# Patient Record
Sex: MALE | Race: WHITE | NOT HISPANIC OR LATINO | Employment: OTHER | URBAN - METROPOLITAN AREA
[De-identification: names, ages, dates, MRNs, and addresses within clinical notes are randomized per-mention and may not be internally consistent; named-entity substitution may affect disease eponyms.]

---

## 2019-10-11 ENCOUNTER — TELEPHONE (OUTPATIENT)
Dept: HEMATOLOGY/ONCOLOGY | Facility: CLINIC | Age: 79
End: 2019-10-11

## 2019-10-11 NOTE — TELEPHONE ENCOUNTER
Return call left voice mail that the message was sent to [employer].          ----- Message from Armond Manriquez sent at 10/10/2019  4:25 PM CDT -----  Contact: Amanda cassidy/ [facility]  Calling to speak with a  regarding an appt for the pt    Contact: [phone]

## 2019-10-17 ENCOUNTER — OFFICE VISIT (OUTPATIENT)
Dept: HEMATOLOGY/ONCOLOGY | Facility: CLINIC | Age: 79
End: 2019-10-17
Payer: OTHER GOVERNMENT

## 2019-10-17 ENCOUNTER — LAB VISIT (OUTPATIENT)
Dept: LAB | Facility: HOSPITAL | Age: 79
End: 2019-10-17
Attending: INTERNAL MEDICINE
Payer: OTHER GOVERNMENT

## 2019-10-17 VITALS
WEIGHT: 234.13 LBS | OXYGEN SATURATION: 94 % | HEART RATE: 60 BPM | RESPIRATION RATE: 18 BRPM | HEIGHT: 72 IN | TEMPERATURE: 98 F | SYSTOLIC BLOOD PRESSURE: 123 MMHG | DIASTOLIC BLOOD PRESSURE: 58 MMHG | BODY MASS INDEX: 31.71 KG/M2

## 2019-10-17 DIAGNOSIS — Z71.89 ADVANCE CARE PLANNING: ICD-10-CM

## 2019-10-17 DIAGNOSIS — C22.0 HCC (HEPATOCELLULAR CARCINOMA): Primary | ICD-10-CM

## 2019-10-17 DIAGNOSIS — K70.30 ALCOHOLIC CIRRHOSIS OF LIVER WITHOUT ASCITES: ICD-10-CM

## 2019-10-17 DIAGNOSIS — C22.0 HCC (HEPATOCELLULAR CARCINOMA): ICD-10-CM

## 2019-10-17 LAB
AFP SERPL-MCNC: 4.8 NG/ML (ref 0–8.4)
ALBUMIN SERPL BCP-MCNC: 3.7 G/DL (ref 3.5–5.2)
ALP SERPL-CCNC: 155 U/L (ref 55–135)
ALT SERPL W/O P-5'-P-CCNC: 26 U/L (ref 10–44)
ANION GAP SERPL CALC-SCNC: 8 MMOL/L (ref 8–16)
AST SERPL-CCNC: 44 U/L (ref 10–40)
BASOPHILS # BLD AUTO: 0.04 K/UL (ref 0–0.2)
BASOPHILS NFR BLD: 0.4 % (ref 0–1.9)
BILIRUB SERPL-MCNC: 1.6 MG/DL (ref 0.1–1)
BUN SERPL-MCNC: 12 MG/DL (ref 8–23)
CALCIUM SERPL-MCNC: 9.6 MG/DL (ref 8.7–10.5)
CHLORIDE SERPL-SCNC: 105 MMOL/L (ref 95–110)
CO2 SERPL-SCNC: 29 MMOL/L (ref 23–29)
CREAT SERPL-MCNC: 1.3 MG/DL (ref 0.5–1.4)
DIFFERENTIAL METHOD: ABNORMAL
EOSINOPHIL # BLD AUTO: 0.3 K/UL (ref 0–0.5)
EOSINOPHIL NFR BLD: 3.2 % (ref 0–8)
ERYTHROCYTE [DISTWIDTH] IN BLOOD BY AUTOMATED COUNT: 14.3 % (ref 11.5–14.5)
EST. GFR  (AFRICAN AMERICAN): 60 ML/MIN/1.73 M^2
EST. GFR  (NON AFRICAN AMERICAN): 51.9 ML/MIN/1.73 M^2
GLUCOSE SERPL-MCNC: 135 MG/DL (ref 70–110)
HCT VFR BLD AUTO: 46.6 % (ref 40–54)
HGB BLD-MCNC: 15.1 G/DL (ref 14–18)
IMM GRANULOCYTES # BLD AUTO: 0.03 K/UL (ref 0–0.04)
IMM GRANULOCYTES NFR BLD AUTO: 0.3 % (ref 0–0.5)
INR PPP: 1.2 (ref 0.8–1.2)
LYMPHOCYTES # BLD AUTO: 1.3 K/UL (ref 1–4.8)
LYMPHOCYTES NFR BLD: 13.5 % (ref 18–48)
MCH RBC QN AUTO: 33.8 PG (ref 27–31)
MCHC RBC AUTO-ENTMCNC: 32.4 G/DL (ref 32–36)
MCV RBC AUTO: 104 FL (ref 82–98)
MONOCYTES # BLD AUTO: 0.9 K/UL (ref 0.3–1)
MONOCYTES NFR BLD: 9.3 % (ref 4–15)
NEUTROPHILS # BLD AUTO: 6.9 K/UL (ref 1.8–7.7)
NEUTROPHILS NFR BLD: 73.3 % (ref 38–73)
NRBC BLD-RTO: 0 /100 WBC
PLATELET # BLD AUTO: 127 K/UL (ref 150–350)
PMV BLD AUTO: 9.3 FL (ref 9.2–12.9)
POTASSIUM SERPL-SCNC: 4.7 MMOL/L (ref 3.5–5.1)
PROT SERPL-MCNC: 6.9 G/DL (ref 6–8.4)
PROTHROMBIN TIME: 12.6 SEC (ref 9–12.5)
RBC # BLD AUTO: 4.47 M/UL (ref 4.6–6.2)
SODIUM SERPL-SCNC: 142 MMOL/L (ref 136–145)
WBC # BLD AUTO: 9.38 K/UL (ref 3.9–12.7)

## 2019-10-17 PROCEDURE — 36415 COLL VENOUS BLD VENIPUNCTURE: CPT

## 2019-10-17 PROCEDURE — 85025 COMPLETE CBC W/AUTO DIFF WBC: CPT

## 2019-10-17 PROCEDURE — 99214 OFFICE O/P EST MOD 30 MIN: CPT | Mod: PBBFAC | Performed by: INTERNAL MEDICINE

## 2019-10-17 PROCEDURE — 82105 ALPHA-FETOPROTEIN SERUM: CPT

## 2019-10-17 PROCEDURE — 99205 PR OFFICE/OUTPT VISIT, NEW, LEVL V, 60-74 MIN: ICD-10-PCS | Mod: S$PBB,,, | Performed by: INTERNAL MEDICINE

## 2019-10-17 PROCEDURE — 85610 PROTHROMBIN TIME: CPT

## 2019-10-17 PROCEDURE — 99205 OFFICE O/P NEW HI 60 MIN: CPT | Mod: S$PBB,,, | Performed by: INTERNAL MEDICINE

## 2019-10-17 PROCEDURE — 99999 PR PBB SHADOW E&M-EST. PATIENT-LVL IV: ICD-10-PCS | Mod: PBBFAC,,, | Performed by: INTERNAL MEDICINE

## 2019-10-17 PROCEDURE — 80053 COMPREHEN METABOLIC PANEL: CPT

## 2019-10-17 PROCEDURE — 99999 PR PBB SHADOW E&M-EST. PATIENT-LVL IV: CPT | Mod: PBBFAC,,, | Performed by: INTERNAL MEDICINE

## 2019-10-17 RX ORDER — SIMVASTATIN 40 MG/1
40 TABLET, FILM COATED ORAL
COMMUNITY

## 2019-10-17 RX ORDER — PROPRANOLOL HYDROCHLORIDE 10 MG/1
10 TABLET ORAL
COMMUNITY

## 2019-10-17 RX ORDER — ALBUTEROL SULFATE 0.83 MG/ML
1 SOLUTION RESPIRATORY (INHALATION) 2 TIMES DAILY PRN
COMMUNITY

## 2019-10-17 RX ORDER — MULTIVITAMIN
1 TABLET ORAL
COMMUNITY

## 2019-10-17 RX ORDER — AMLODIPINE BESYLATE 5 MG/1
5 TABLET ORAL
COMMUNITY

## 2019-10-17 RX ORDER — SPIRONOLACTONE 25 MG/1
50 TABLET ORAL
COMMUNITY

## 2019-10-17 RX ORDER — FUROSEMIDE 20 MG/1
20 TABLET ORAL
COMMUNITY

## 2019-10-17 NOTE — Clinical Note
Schedule for first available MRI abdomen/pelvis and let patient know. Lives in [employer] [place]. Need to see IR for local treatment for liver cancer

## 2019-10-17 NOTE — PROGRESS NOTES
"CC:  HCC    HPI:  Mr Lang is a 80 yo man with PMH of COPD, hypertension, abdominal aortic aneurysm status post repair and alcoholic cirrhosis of the liver who is referred by Dr Geronimo for a second opinion re localized liver cancer. [facility] As [facility] Dr Geronimo's note, [facility].  "Per review of his available outside hospital records it appears that he was initially noted to have a small lesion in the right lobe of the liver on an MRI scan in 2013 and this was subsequently followed up with additional imaging studies over the next 2 years which continue to demonstrate evidence of a poorly defined mass with heterogenous enhancement with rapid washout in segment 7 of the liver. This led to his referral to medical oncology and following that time he had another CT scan in September 2016 that showed evidence of a 2.2 cm enhancing nodule within the right hepatic lobe highly concerning for hepatocellular carcinoma in the setting of underlying cirrhosis of the liver. It was initially discussed about the possibility of obtaining a liver biopsy at [facility] but after patient's case was discussed at the [facility] tumor board plans for biopsy were canceled as they felt that his imaging findings in the setting of cirrhosis were confirmatory for HCC. Patient then had a microwave ablation done to his segment 7 lesion in [place] in 2016. Since that time patient has been followed on observation for his liver cancer with periodic surveillance imaging studies. He did have an MRI of the liver done in March 2018 which showed a new small focus of subtle arterial enhancement with washout near his previous segment 7 ablation site as well as numerous additional small arterial enhancing foci with relative washout. It was discussed at that time possible referral back to the [place] tumor board but this was not subsequently done. Patient subsequently most " "recently had an MRI of the abdomen with contrast on 2019 to reassess his liver lesions. At that time multiple enhancing lesions within segment 8 of the liver were seen with the largest measuring up to 2 cm in size and smaller lesions were also noted as well. There is a 3 mm lesion within segment 6 of the liver with faint washout detected. In addition there was a faintly hyperintense 1.4 cm focus within segment 7 of the liver demonstrating no evidence of arterial enhancement at the site of his previous ablation however there was a 2.1 cm focus of washout detected medial to this area which had enlarged in comparison to his previous MRI in 2019." He was referred to Dr Geroinmo. Labs at [facility] showed preserved liver function and normal AFP.   CT A/P 19 showed "A 15 x 17 mm hypoenhancing nodule right lobe of the liver is noted and likely represents a treated HCC.  No new or other malignant liver lesion can be documented. Hepatic cirrhosis with multiple esophageal and perigastric varices. Previous endovascular repair of abdominal aortic aneurysm.  Aneurysm sac has decreased in size. Chronic dilatation of the intrahepatic bile ducts left lobe of liver is stable. Nonobstructing right nephrolithiasis; cholelithiasis. Enlarged prostate gland  CT chest 19 showed "Dependent atelectasis or scarring within the lower lobes bilaterally is noted.  No mass or nodule is seen.  Esophageal varices"  Local treatment was recommended. Patient presents today for a second opinion. He is feeling well. Does have chest congestion from the weather changes. Uses nebulizer at home for COPD. Drinks 3 glasses of wine every evening. Chases his 4yr granddaughter at home. Cannot walk far because of COPD.     ECO    Past Medical History:   Diagnosis Date    COPD (chronic obstructive pulmonary disease)     Hypertension         Past Surgical History:   Procedure Laterality Date    CATARACT EXTRACTION      CYST REMOVAL   "       Family History   Problem Relation Age of Onset    Parkinsonism Mother     Cancer Father        Social History     Socioeconomic History    Marital status:      Spouse name: Not on file    Number of children: Not on file    Years of education: Not on file    Highest education level: Not on file   Occupational History    Not on file   Social Needs    Financial resource strain: Not on file    Food insecurity:     Worry: Not on file     Inability: Not on file    Transportation needs:     Medical: Not on file     Non-medical: Not on file   Tobacco Use    Smoking status: Not on file   Substance and Sexual Activity    Alcohol use: Not on file    Drug use: Not on file    Sexual activity: Not on file   Lifestyle    Physical activity:     Days per week: Not on file     Minutes per session: Not on file    Stress: Not on file   Relationships    Social connections:     Talks on phone: Not on file     Gets together: Not on file     Attends Hoahaoism service: Not on file     Active member of club or organization: Not on file     Attends meetings of clubs or organizations: Not on file     Relationship status: Not on file   Other Topics Concern    Not on file   Social History Narrative    Not on file       Review of Systems   Constitutional: Negative for appetite change, chills, fatigue, fever and unexpected weight change.   HENT: Negative for mouth sores, nosebleeds, tinnitus, trouble swallowing and voice change.    Eyes: Negative for pain, redness and visual disturbance.   Respiratory: Positive for shortness of breath (chronic). Negative for cough and wheezing.    Cardiovascular: Negative for chest pain, palpitations and leg swelling.   Gastrointestinal: Negative for abdominal distention, abdominal pain, blood in stool, constipation, diarrhea, nausea and vomiting.   Endocrine: Negative for polydipsia, polyphagia and polyuria.   Genitourinary: Negative for flank pain, frequency and hematuria.    Musculoskeletal: Negative for arthralgias, back pain, gait problem, joint swelling, myalgias, neck pain and neck stiffness.   Skin: Negative for color change, pallor, rash and wound.   Neurological: Negative for tremors, seizures, syncope, speech difficulty, weakness, light-headedness, numbness and headaches.   Hematological: Negative for adenopathy. Does not bruise/bleed easily.   Psychiatric/Behavioral: Negative for confusion, dysphoric mood and self-injury. The patient is not nervous/anxious.    All other systems reviewed and are negative.      Objective:  Physical Exam   Constitutional: He is oriented to person, place, and time. He appears well-developed and well-nourished. No distress.   HENT:   Head: Normocephalic and atraumatic.   Mouth/Throat: Oropharynx is clear and moist. No oropharyngeal exudate.   Eyes: Pupils are equal, round, and reactive to light. Conjunctivae and EOM are normal. No scleral icterus.   Neck: Normal range of motion. Neck supple. No JVD present. No thyromegaly present.   Cardiovascular: Normal rate, regular rhythm and normal heart sounds. Exam reveals no gallop and no friction rub.   No murmur heard.  Pulmonary/Chest: Effort normal and breath sounds normal. No respiratory distress. He has no wheezes. He has no rales.   Abdominal: Soft. Bowel sounds are normal. He exhibits no distension and no mass. There is no hepatosplenomegaly. There is no tenderness. There is no rebound. No hernia.   Musculoskeletal: Normal range of motion. He exhibits edema (b/l LE trace edema). He exhibits no tenderness or deformity.   Lymphadenopathy:     He has no cervical adenopathy.        Right: No supraclavicular adenopathy present.        Left: No supraclavicular adenopathy present.   Neurological: He is alert and oriented to person, place, and time. No cranial nerve deficit. He exhibits normal muscle tone.   Skin: Skin is warm and dry. No rash noted. No erythema. No pallor.   Psychiatric: He has a normal  "mood and affect. His behavior is normal. Judgment and thought content normal.   Vitals reviewed.      Labs:  Labs from [facility]  9/12/19  WBC 8.4, Hb 15.3, plt count 129, creatinine 1.02, bili 1.6, albumin 4.1, AST 52, ALT 27, AFP normal 4.8    Imaging Data:  [facility]: "multiple enhancing lesions within segment 8 of the liver were seen with the largest measuring up to 2 cm in size and smaller lesions were also noted as well. There is a 3 mm lesion within segment 6 of the liver with faint washout detected. In addition there was a faintly hyperintense 1.4 cm focus within segment 7 of the liver demonstrating no evidence of arterial enhancement at the site of his previous ablation however there was a 2.1 cm focus of washout detected medial to this area which had enlarged in comparison to his previous MRI in March 2019."     CT chest 9/17/19 showed "Dependent atelectasis or scarring within the lower lobes bilaterally is noted.  No mass or nodule is seen.  Esophageal varices"    Assessment and plan:    1. HCC (hepatocellular carcinoma)    2. Alcoholic cirrhosis of liver without ascites    3. Advance care planning      1.2  - Mr Lang is a 80 yo man with HCC treated with ablation to segment 7 lesion in 2016. [facility] showing "multiple enhancing lesions within segment 8 of the liver were seen with the largest measuring up to 2 cm in size and smaller lesions were also noted as well. There is a 3 mm lesion within segment 6 of the liver with faint washout detected. In addition there was a faintly hyperintense 1.4 cm focus within segment 7 of the liver demonstrating no evidence of arterial enhancement at the site of his previous ablation however there was a 2.1 cm focus of washout detected medial to this area which had enlarged in comparison to his previous MRI in March 2019."   - no imaging was brought in today. [facility] " ([facility] abdomen Aug 2019, March 2019 and previous) and [facility] (CT C/A/P Sep 2019)  - will get MRI abdomen/pelvis here since the last one was two months ago  - Check CBC, CMP, INR, AFP  - discussed with patient that if repeat MRI confirmed localized HCC within the liver, I recommend local treatment such as ablation or TACE.   - consult IR for local treatment for HCC  - I will call him with the MRI result    3.   Advance Care Planning     Power of   I initiated the process of advance care planning today and explained the importance of this process to the patient.  I introduced the concept of advance directives to the patient, as well. Then the patient received detailed information about the importance of designating a Health Care Power of  (HCPOA). He was also instructed to communicate with this person about their wishes for future healthcare, should he become sick and lose decision-making capacity. The patient has previously appointed a HCPOA. After our discussion, the patient has decided to complete a HCPOA and has appointed his significant other and NAME: Gilam Paco .       Their multiple questions were answered in the office. Encouraged to call should questions arise.

## 2019-10-17 NOTE — LETTER
October 17, 2019      Roland Geronimo MD  [address]  [place] [address]           [facility] Hematology Oncology  [facility]  [place] [address]  Phone: [phone]          Patient: Molina Lang Jr   MR Number: [id number]   YOB: 1940   Date of Visit: 10/17/2019       Dear Dr. Roland Geronimo:    Thank you for referring Molina Lang Jr to me for evaluation. Attached you will find relevant portions of my assessment and plan of care.    If you have questions, please do not hesitate to call me. I look forward to following Molina Lang Jr along with you.    Sincerely,    Best Delgado MD    Enclosure  CC:  No Recipients    If you would like to receive this communication electronically, please contact [email or web address] or [phone] to request more information on [employer] access.    For providers and/or their staff who would like to refer a patient to [facility], please contact us through our one-stop-shop provider referral line, [facility] , at [phone].    If you feel you have received this communication in error or would no longer like to receive these types of communications, please e-mail [email or web address]

## 2019-10-22 ENCOUNTER — TELEPHONE (OUTPATIENT)
Dept: HEMATOLOGY/ONCOLOGY | Facility: CLINIC | Age: 79
End: 2019-10-22

## 2019-10-22 NOTE — TELEPHONE ENCOUNTER
"Returned call and spoke with Hector with [facility]. Hector calling to verify the order for Mr Chambers MRI Abdomen/Pelvis w/wo contrast. Hector states she would like to give the radiologist a bit more information as to the reason the Pelvis is being ordered."Is the Dr looking for Mets to the Pelvis"? This test is a very long exam. This test will take up to 2 hours and it is a very difficult test for patient's to go though.      "

## 2019-10-22 NOTE — TELEPHONE ENCOUNTER
----- Message from Purnima Lizz sent at 10/22/2019 10:24 AM CDT -----  Contact: Lala [phone]   with [facility]   Pt.is scheduled to come in at Friday at 2pm.   Does pt. Need the Pelvic also.   Order is for Abdomen and Pelvic.   Needs a dx for the Pelvic .   Pls call .

## 2019-10-22 NOTE — TELEPHONE ENCOUNTER
----- Message from Salma Monk sent at 10/22/2019 12:16 PM CDT -----  Contact: Lala ([facility]): [phone]  Lala state she is returning a call from someone in the clinic re the pt     Please contact Lala ([email or web address]): [phone]

## 2019-10-22 NOTE — TELEPHONE ENCOUNTER
Returned call to Lala with [facility]. No answer, message left on voice mail requesting a return call.

## 2019-10-28 DIAGNOSIS — C22.0 HCC (HEPATOCELLULAR CARCINOMA): Primary | ICD-10-CM

## 2019-10-31 ENCOUNTER — HOSPITAL ENCOUNTER (OUTPATIENT)
Dept: RADIOLOGY | Facility: HOSPITAL | Age: 79
Discharge: HOME | End: 2019-10-31
Attending: INTERNAL MEDICINE
Payer: COMMERCIAL

## 2019-10-31 DIAGNOSIS — C22.0 HCC (HEPATOCELLULAR CARCINOMA): ICD-10-CM

## 2019-10-31 PROCEDURE — 74183 MRI ABD W/O CNTR FLWD CNTR: CPT | Mod: TC

## 2019-10-31 PROCEDURE — 25500020 PHARM REV CODE 255: Performed by: INTERNAL MEDICINE

## 2019-10-31 PROCEDURE — 74183 MRI ABD W/O CNTR FLWD CNTR: CPT | Mod: 26,,, | Performed by: RADIOLOGY

## 2019-10-31 PROCEDURE — 74183 MRI ABDOMEN W WO CONTRAST: ICD-10-PCS | Mod: 26,,, | Performed by: RADIOLOGY

## 2019-10-31 PROCEDURE — A9585 GADOBUTROL INJECTION: HCPCS | Performed by: INTERNAL MEDICINE

## 2019-10-31 RX ORDER — GADOBUTROL 604.72 MG/ML
10 INJECTION INTRAVENOUS
Status: COMPLETED | OUTPATIENT
Start: 2019-10-31 | End: 2019-10-31

## 2019-10-31 RX ADMIN — GADOBUTROL 10 ML: 604.72 INJECTION INTRAVENOUS at 01:10

## 2019-11-01 ENCOUNTER — TELEPHONE (OUTPATIENT)
Dept: HEMATOLOGY/ONCOLOGY | Facility: CLINIC | Age: 79
End: 2019-11-01

## 2019-11-01 NOTE — TELEPHONE ENCOUNTER
Spoke with Mr Lang over the phone. Discussed MRI abdomen result. It showed three HCC in the liver. Will reach out to IR to see when they can schedule the consult for local treatment. [email or web address]. Patient understands and agrees with the plan.

## 2019-11-05 ENCOUNTER — TELEPHONE (OUTPATIENT)
Dept: HEMATOLOGY/ONCOLOGY | Facility: CLINIC | Age: 79
End: 2019-11-05

## 2019-11-05 NOTE — TELEPHONE ENCOUNTER
----- Message from Bria Lopez sent at 11/5/2019  8:15 AM CST -----  Contact: john from [facility]   john from [facility] called to speak with You   Callback#[phone]  Thank You  UCHE Lopez

## 2019-11-05 NOTE — TELEPHONE ENCOUNTER
Returned call and spoke with Stephania with [facility] --Release of Information. Stephania calling to inform us the the disc are sent via [employer]. They went out today and she faxed copies of medical records x 2. Informed Stephania I never received her faxes. Gave Stephania another fax number to fax the records to. Done   Records received.

## 2019-11-05 NOTE — TELEPHONE ENCOUNTER
----- Message from Kayleigh Hermosillo, Patient Care Assistant sent at 11/5/2019  3:55 PM CST -----  Contact: [facility]  Name of Who is Calling: [facility]    What is the request in detail:Requesting to speak with someone in regards of fax. Charline states records are in route through [employer].  Please contact to further discuss and advise      Can the clinic reply by [facility]: No    [facility] Back [facility]   [phone]

## 2019-11-05 NOTE — TELEPHONE ENCOUNTER
Returned call to Charline with [facility] release of information. No answer, message left on voice mail.

## 2019-11-05 NOTE — TELEPHONE ENCOUNTER
Placed 2nd call and spoke with Charline. Charline states she did not receive the release of information fax to their office yesterday. Charline ask if I could re fax the DEB to a different fax number [phone]. Done.

## 2019-11-06 ENCOUNTER — TELEPHONE (OUTPATIENT)
Dept: HEMATOLOGY/ONCOLOGY | Facility: CLINIC | Age: 79
End: 2019-11-06

## 2019-11-06 NOTE — TELEPHONE ENCOUNTER
Documentation: Received from [facility], 2 disc containing the following. Ct Scan Chest/Thorax with Contrast and Ct Scan Abdomen and Pelvis with Contrast dated Sept 17, 2019.   Disc submitted to [facility] today for up uploading. Request ASAP

## 2019-11-12 ENCOUNTER — TELEPHONE (OUTPATIENT)
Dept: HEMATOLOGY/ONCOLOGY | Facility: CLINIC | Age: 79
End: 2019-11-12

## 2019-11-12 NOTE — TELEPHONE ENCOUNTER
Called and spoke with Mr Lang, giving him appointment information. Mr Lang has been scheduled for his consultation visit with Interventional Radiology on Nov. 21 @ 10:00 am. Appointment information mailed to patient.

## 2019-11-13 ENCOUNTER — TELEPHONE (OUTPATIENT)
Dept: HEMATOLOGY/ONCOLOGY | Facility: CLINIC | Age: 79
End: 2019-11-13

## 2019-11-13 NOTE — TELEPHONE ENCOUNTER
Called and spoke with Mr Lang. He is scheduled for IR consult next Thursday. Discussed with Mr Lang that he can either follow up with me or with Dr Geronimo at [facility]. Mr Lang prefers to see Dr Geronimo as he is closer. Encouraged to call should questions arise.

## 2019-11-21 ENCOUNTER — OFFICE VISIT (OUTPATIENT)
Dept: INTERVENTIONAL RADIOLOGY/VASCULAR | Facility: CLINIC | Age: 79
End: 2019-11-21
Payer: COMMERCIAL

## 2019-11-21 VITALS
DIASTOLIC BLOOD PRESSURE: 67 MMHG | WEIGHT: 233.69 LBS | SYSTOLIC BLOOD PRESSURE: 120 MMHG | BODY MASS INDEX: 31.65 KG/M2 | HEIGHT: 72 IN | HEART RATE: 56 BPM

## 2019-11-21 DIAGNOSIS — C22.0 HCC (HEPATOCELLULAR CARCINOMA): Primary | ICD-10-CM

## 2019-11-21 PROCEDURE — 99244 OFF/OP CNSLTJ NEW/EST MOD 40: CPT | Mod: S$PBB,,, | Performed by: FAMILY MEDICINE

## 2019-11-21 PROCEDURE — 99999 PR PBB SHADOW E&M-EST. PATIENT-LVL III: ICD-10-PCS | Mod: PBBFAC,,, | Performed by: FAMILY MEDICINE

## 2019-11-21 PROCEDURE — 99999 PR PBB SHADOW E&M-EST. PATIENT-LVL III: CPT | Mod: PBBFAC,,, | Performed by: FAMILY MEDICINE

## 2019-11-21 PROCEDURE — 99213 OFFICE O/P EST LOW 20 MIN: CPT | Mod: PBBFAC | Performed by: FAMILY MEDICINE

## 2019-11-21 PROCEDURE — 99244 PR OFFICE CONSULTATION,LEVEL IV: ICD-10-PCS | Mod: S$PBB,,, | Performed by: FAMILY MEDICINE

## 2019-11-21 NOTE — LETTER
November 23, 2019      Best Delgado MD  [address]  [address]  [place] [address]           [facility] - Interventional Rad  [address]  [facility] [address]  Phone: [phone]          Patient: Molina Lang Jr   MR Number: [id number]   YOB: 1940   Date of Visit: 11/21/2019       Dear Dr. Best Delgado:    Thank you for referring Molina Lang Jr to me for evaluation. Attached you will find relevant portions of my assessment and plan of care.    If you have questions, please do not hesitate to call me. I look forward to following Molina Lang Jr along with you.    Sincerely,    Maryse Stoddard, NP    Enclosure  CC:  No Recipients    If you would like to receive this communication electronically, please contact [email or web address] or [phone] to request more information on [employer] Link access.    For providers and/or their staff who would like to refer a patient to [facility], please contact us through our one-stop-shop provider referral line, [facility], at [phone].    If you feel you have received this communication in error or would no longer like to receive these types of communications, please e-mail [email or web address]

## 2019-11-21 NOTE — PROGRESS NOTES
Subjective:       Patient ID: Molina Lang Jr is a 79 y.o. male.    Chief Complaint: Hepatocellular Carcinoma    Patient referred to Interventional Radiology by Dr. Delgado to discuss treatment options for hepatocellular carcinoma. Patient was originally diagnosed with HCC in 2016 and subsequently treated with ablation at an outside facility. During routine surveillance with MRI in August 2019, multiple enhancing lesions were noted. A repeat MRI on 10/31/2019 noted 3 lesions that were consistent with hepatocellular carcinoma. He has complaints of fatigue and decreased activity, but attributes this to a recent URI. He denies any abdominal pain or abdominal distention.     Review of Systems   Constitutional: Positive for activity change (cold weather and recent URI) and fatigue (x1 year). Negative for appetite change, chills and fever.   HENT: Negative for congestion, drooling, ear discharge, postnasal drip, sneezing and trouble swallowing.    Eyes: Negative for pain, discharge, redness and itching.        Reading glasses   Respiratory: Positive for cough (alleviated with mucinex and albuterol). Negative for shortness of breath, wheezing and stridor.    Cardiovascular: Negative for chest pain, palpitations and leg swelling.   Gastrointestinal: Negative for abdominal distention, abdominal pain, constipation, diarrhea, nausea and vomiting.   Genitourinary: Negative for difficulty urinating, dysuria, frequency and urgency.   Musculoskeletal: Negative for arthralgias, back pain, gait problem, joint swelling, myalgias and neck pain.   Skin: Negative for color change, pallor and rash.   Neurological: Negative for dizziness, weakness and headaches.       Objective:      Physical Exam   Constitutional: He is oriented to person, place, and time. He appears well-developed and well-nourished. No distress.   HENT:   Head: Normocephalic and atraumatic.   Right Ear: External ear normal.   Left Ear: External ear normal.   Nose: Nose  normal.   Mouth/Throat: Oropharynx is clear and moist. No oropharyngeal exudate.   Eyes: Pupils are equal, round, and reactive to light. Conjunctivae are normal. Right eye exhibits no discharge. Left eye exhibits no discharge. No scleral icterus.   Neck: Neck supple. No JVD present. No tracheal deviation present. No thyromegaly present.   Cardiovascular: Normal rate, regular rhythm, normal heart sounds and intact distal pulses. Exam reveals no gallop and no friction rub.   No murmur heard.  Pulmonary/Chest: Effort normal and breath sounds normal. No stridor. No respiratory distress. He has no wheezes. He has no rales.   Abdominal: Soft. Bowel sounds are normal. He exhibits no distension and no mass. There is no hepatosplenomegaly. There is no tenderness. There is no rebound and no guarding.   Musculoskeletal: He exhibits no edema.   Lymphadenopathy:     He has no cervical adenopathy.   Neurological: He is alert and oriented to person, place, and time. Gait normal.   Skin: Skin is warm and dry. He is not diaphoretic. No cyanosis. Nails show no clubbing.   Psychiatric: He has a normal mood and affect.   Vitals reviewed.      ECO  MELD-Na score: 13 at 10/17/2019  2:25 PM  MELD score: 13 at 10/17/2019  2:25 PM  Calculated from:  Serum Creatinine: 1.3 mg/dL at 10/17/2019  2:25 PM  Serum Sodium: 142 mmol/L (Rounded to 137 mmol/L) at 10/17/2019  2:25 PM  Total Bilirubin: 1.6 mg/dL at 10/17/2019  2:25 PM  INR(ratio): 1.2 at 10/17/2019  2:25 PM  Age: 79 years  Transplant Status: not a candidate     Assessment:       1. HCC (hepatocellular carcinoma)        Plan:         Discussed case with Dr. Cortes and Dr. King. Explained to patient recommendation is to treat with radioembolization. Yttrium 90 Radioembolization discussed in detail with the patient including risks (including, but not limited to, pain, bleeding, infection, damage to nearby structures, and the need for additional procedures), benefits, potential  complications, usual pre and post procedure course.  Discussed the need for initial Angiogram mapping study prior to scheduling the actual Radioembolization procedure. Utilized images from the patient's chart, the internet, and illustrations to help explain procedure. Explained mapping may be difficult due to his previous aortic aneurysm repair. If unable to perform mapping, then may have to treat with ablation in stages. The patient voices understanding and all questions have been answered.  The patient agrees to proceed as planned. Dr. King in room. Written informed consent obtained. Patient scheduled for mapping on 12/9/2019. Pre-procedure handout with clinic phone number provided.

## 2019-11-21 NOTE — Clinical Note
"Thank you for referring Mr. Lang to Interventional Radiology at the [facility]. Please don't hesitate to contact us if there are any questions regarding this evaluation at [phone]. If you have any other patients for whom you would like a consultation, please place an order for "Amb consult to [facility] Interventional Rad", and we will be happy to review their case.Sincerely,SID Schwarz, FNPInterventional Radiology"

## 2019-11-21 NOTE — LETTER
November 21, 2019    Molina MIQUEL Precious Akers  [address] [facility] [place] [address]     [facility] - Interventional Rad  [address]  [facility] [address]  Phone: [phone] PRE-PROCEDURE INSTRUCTIONS    Your procedure with Interventional Radiology is scheduled for December 9, 2019. Please arrive by 8:30am.    You must check-in and receive a wristband before going to your procedure. Your check-in location is Laboratory then Day of [facility] Center.    **Do not eat or drink anything between midnight and the time of your procedure. This includes gum, mints, and candy lemon drops.    **Do not smoke or drink alcoholic beverages 24 hours prior to your procedure.    **If you wear contact lenses, dentures, hearing aids, or glasses, bring a container to put them in during the procedure and give them to a family member for safekeeping.    **If you have been diagnosed with sleep apnea please bring your CPAP machine.    **If your doctor has scheduled you for an overnight stay, bring a small overnight bag with any personal items that you may need.    **Make arrangements in advance for transportation home by a responsible adult. It is not safe to drive a vehicle during the 24 hours following the procedure.    **All [facility] facilities and properties are tobacco free. Smoking is NOT allowed.    PLEASE NOTE: The procedure schedule has many variables which affect the time of your procedure. Family members should be available if your surgery time changes.    If you have any questions about these instructions call Interventional Radiology at [phone] Monday - Friday between 8:00am and 4:00pm or [phone] for after hours.

## 2019-11-25 DIAGNOSIS — C22.0 HCC (HEPATOCELLULAR CARCINOMA): Primary | ICD-10-CM

## 2019-12-10 ENCOUNTER — PATIENT MESSAGE (OUTPATIENT)
Dept: HEMATOLOGY/ONCOLOGY | Facility: CLINIC | Age: 79
End: 2019-12-10

## 2019-12-13 DIAGNOSIS — C22.0 HCC (HEPATOCELLULAR CARCINOMA): Primary | ICD-10-CM

## 2019-12-13 RX ORDER — HEPARIN SODIUM 200 [USP'U]/100ML
500 INJECTION, SOLUTION INTRAVENOUS CONTINUOUS
Status: CANCELLED | OUTPATIENT
Start: 2019-12-13

## 2019-12-16 ENCOUNTER — HOSPITAL ENCOUNTER (OUTPATIENT)
Facility: HOSPITAL | Age: 79
Discharge: HOME | End: 2019-12-16
Attending: RADIOLOGY | Admitting: RADIOLOGY
Payer: COMMERCIAL

## 2019-12-16 ENCOUNTER — HOSPITAL ENCOUNTER (OUTPATIENT)
Dept: RADIOLOGY | Facility: HOSPITAL | Age: 79
Discharge: HOME | End: 2019-12-16
Attending: FAMILY MEDICINE | Admitting: RADIOLOGY
Payer: COMMERCIAL

## 2019-12-16 ENCOUNTER — HOSPITAL ENCOUNTER (OUTPATIENT)
Dept: RADIOLOGY | Facility: HOSPITAL | Age: 79
Discharge: HOME | End: 2019-12-16
Attending: RADIOLOGY | Admitting: RADIOLOGY
Payer: COMMERCIAL

## 2019-12-16 ENCOUNTER — RESEARCH ENCOUNTER (OUTPATIENT)
Dept: RESEARCH | Facility: HOSPITAL | Age: 79
End: 2019-12-16

## 2019-12-16 VITALS
WEIGHT: 235 LBS | HEART RATE: 62 BPM | BODY MASS INDEX: 31.83 KG/M2 | RESPIRATION RATE: 18 BRPM | HEIGHT: 72 IN | SYSTOLIC BLOOD PRESSURE: 130 MMHG | DIASTOLIC BLOOD PRESSURE: 65 MMHG | OXYGEN SATURATION: 96 % | TEMPERATURE: 98 F

## 2019-12-16 DIAGNOSIS — C22.0 HCC (HEPATOCELLULAR CARCINOMA): ICD-10-CM

## 2019-12-16 PROCEDURE — A9540 TC99M MAA: HCPCS

## 2019-12-16 PROCEDURE — 78201 LIVER IMAGING STATIC ONLY: CPT | Mod: TC

## 2019-12-16 PROCEDURE — 78999 NM FUSION SPECT CT: ICD-10-PCS | Mod: 26,,, | Performed by: RADIOLOGY

## 2019-12-16 PROCEDURE — 25500020 PHARM REV CODE 255: Performed by: RADIOLOGY

## 2019-12-16 PROCEDURE — 78201 NM LIVER IMAGING STATIC PRE Y-90 EMBOLIZATION: ICD-10-PCS | Mod: 26,,, | Performed by: RADIOLOGY

## 2019-12-16 PROCEDURE — 78999 UNLISTED MISC PX DX NUC MED: CPT | Mod: 26,,, | Performed by: RADIOLOGY

## 2019-12-16 PROCEDURE — 63600175 PHARM REV CODE 636 W HCPCS: Performed by: FAMILY MEDICINE

## 2019-12-16 PROCEDURE — 78201 LIVER IMAGING STATIC ONLY: CPT | Mod: 26,,, | Performed by: RADIOLOGY

## 2019-12-16 PROCEDURE — 78804 RP LOCLZJ TUM WHBDY 2+D IMG: CPT | Mod: TC

## 2019-12-16 PROCEDURE — 63600175 PHARM REV CODE 636 W HCPCS: Performed by: RADIOLOGY

## 2019-12-16 RX ORDER — MIDAZOLAM HYDROCHLORIDE 1 MG/ML
INJECTION INTRAMUSCULAR; INTRAVENOUS CODE/TRAUMA/SEDATION MEDICATION
Status: COMPLETED | OUTPATIENT
Start: 2019-12-16 | End: 2019-12-16

## 2019-12-16 RX ORDER — MIDAZOLAM HYDROCHLORIDE 1 MG/ML
1 INJECTION INTRAMUSCULAR; INTRAVENOUS
Status: DISCONTINUED | OUTPATIENT
Start: 2019-12-16 | End: 2019-12-16 | Stop reason: HOSPADM

## 2019-12-16 RX ORDER — LIDOCAINE HYDROCHLORIDE 10 MG/ML
1 INJECTION, SOLUTION EPIDURAL; INFILTRATION; INTRACAUDAL; PERINEURAL ONCE AS NEEDED
Status: DISCONTINUED | OUTPATIENT
Start: 2019-12-16 | End: 2019-12-16 | Stop reason: HOSPADM

## 2019-12-16 RX ORDER — SODIUM CHLORIDE 9 MG/ML
INJECTION, SOLUTION INTRAVENOUS CONTINUOUS
Status: DISCONTINUED | OUTPATIENT
Start: 2019-12-16 | End: 2019-12-16 | Stop reason: HOSPADM

## 2019-12-16 RX ORDER — FENTANYL CITRATE 50 UG/ML
50 INJECTION, SOLUTION INTRAMUSCULAR; INTRAVENOUS
Status: DISCONTINUED | OUTPATIENT
Start: 2019-12-16 | End: 2019-12-16 | Stop reason: HOSPADM

## 2019-12-16 RX ORDER — FENTANYL CITRATE 50 UG/ML
INJECTION, SOLUTION INTRAMUSCULAR; INTRAVENOUS CODE/TRAUMA/SEDATION MEDICATION
Status: COMPLETED | OUTPATIENT
Start: 2019-12-16 | End: 2019-12-16

## 2019-12-16 RX ADMIN — FENTANYL CITRATE 25 MCG: 50 INJECTION, SOLUTION INTRAMUSCULAR; INTRAVENOUS at 03:12

## 2019-12-16 RX ADMIN — IOHEXOL 125 ML: 300 INJECTION, SOLUTION INTRAVENOUS at 04:12

## 2019-12-16 RX ADMIN — FENTANYL CITRATE 50 MCG: 50 INJECTION, SOLUTION INTRAMUSCULAR; INTRAVENOUS at 01:12

## 2019-12-16 RX ADMIN — MIDAZOLAM HYDROCHLORIDE 0.5 MG: 1 INJECTION, SOLUTION INTRAMUSCULAR; INTRAVENOUS at 03:12

## 2019-12-16 RX ADMIN — FENTANYL CITRATE 25 MCG: 50 INJECTION, SOLUTION INTRAMUSCULAR; INTRAVENOUS at 02:12

## 2019-12-16 RX ADMIN — FENTANYL CITRATE 50 MCG: 50 INJECTION, SOLUTION INTRAMUSCULAR; INTRAVENOUS at 03:12

## 2019-12-16 RX ADMIN — MIDAZOLAM HYDROCHLORIDE 1 MG: 1 INJECTION, SOLUTION INTRAMUSCULAR; INTRAVENOUS at 03:12

## 2019-12-16 RX ADMIN — AMPICILLIN SODIUM AND SULBACTAM SODIUM 3 G: 2; 1 INJECTION, POWDER, FOR SOLUTION INTRAMUSCULAR; INTRAVENOUS at 01:12

## 2019-12-16 RX ADMIN — MIDAZOLAM HYDROCHLORIDE 1 MG: 1 INJECTION, SOLUTION INTRAMUSCULAR; INTRAVENOUS at 02:12

## 2019-12-16 NOTE — PROGRESS NOTES
Protocol: Radiation-Emitting SIR-Spheres in Non-resectable (RESIN) Liver Tumor Patient Registry  Protocol #: [id number]  Sponsor: [facility]  PI: Tee Wilson MD  IRB #: [id number]  Version Date: IRB 2/27/2018 December 16th 2019     I met with the patient to discuss the above mentioned registry trial. He is alert and oriented x 3. Mood and affect appropriate to the situation. Patient states that he is not participating in any other research studies. Patient states that he understands that he is getting mapped for targeted treatment for his liver cancer.    Purpose and length of the study reviewed with the patient. Patient states understanding of this information.   Study procedure discussed with the patient. Patient verbalized that he understands that there are no additional labs or procedures required to be enrolled in the registry and what information will be collected at his future visits.   Study associated risks reviewed with patient. Patient verbalized understanding that there are no physical risks for participating in the registry; verbalized understanding that he will have a study number that will identify him.  Potential benefits and costs and/or payment reimbursement all reviewed with the patient. Patient states he understands that there is no cost to participate in the registry; he understands that [facility] is funding [facility] to conduct the research.  Alternative treatment methods discussed with patient; He states understanding of this information.   Study related questions/compensation for injury, and whom to contact regarding rights as a research subject all reviewed with patient; He verbalized understanding of this information.   Voluntary participation and withdrawal from research stressed to patient; He states he understands that he may withdraw consent at any time without compromise to his care.   Confidentiality and HIPAA discussed with  patient; process of protection and security of database explained to patient; He verbalizes understanding of this information. Informed patient that detailed information on this trial can be found at www.clinicaltrials.gov.     Throughout the consenting process, patient given several opportunities to ask questions; all questions addressed and answered to patient's satisfaction per myself. Patient states his willingness to participate in the study; patient signed and dated the consent form; copy of the consent form given to patient along with my contact information. Instructed patient to notify myself for any questions and/or concerns. Patient verbalized understanding.

## 2019-12-16 NOTE — PLAN OF CARE
Pre Y90 completed, pt tolerated well. No apparent distress noted. Exoseal deployed HOB to be elevated at 1800. Dressing applied CDI. Pt to be transferred to [facility] then ROCU, report to be given at bedside.

## 2019-12-16 NOTE — DISCHARGE INSTRUCTIONS
For scheduling: Call Nitza at [phone]    For questions or concerns call: MOHAN MON-FRI 8 AM- 5PM [phone]. Radiology resident on call [phone].    For immediate concerns that are not emergent, you may call our radiology clinic at: [phone]

## 2019-12-16 NOTE — H&P
Radiology History & Physical      SUBJECTIVE:     Chief Complaint: HCC    History of Present Illness:  Molina Lang Jr is a 79 y.o. male with HCC who presents for Y-90 mapping.     Past Medical History:   Diagnosis Date    Cancer     HCC; skin    COPD (chronic obstructive pulmonary disease)     Hyperlipidemia     Hypertension      Past Surgical History:   Procedure Laterality Date    CATARACT EXTRACTION      CYST REMOVAL      REPAIR OF ABDOMINAL AORTIC ANEURYSM         Home Meds:   Prior to Admission medications    Medication Sig Start Date End Date Taking? Authorizing Provider   amLODIPine (NORVASC) 5 MG tablet Take 5 mg by mouth.   Yes Historical Provider, MD   dextromethorphan-guaifenesin  mg (MUCINEX DM)  mg per 12 hr tablet Take 1 tablet by mouth.   Yes Historical Provider, MD   furosemide (LASIX) 20 MG tablet Take 20 mg by mouth.   Yes Historical Provider, MD   multivitamin (THERAGRAN) per tablet Take 1 tablet by mouth.   Yes Historical Provider, MD   propranolol (INDERAL) 10 MG tablet Take 10 mg by mouth.   Yes Historical Provider, MD   simvastatin (ZOCOR) 40 MG tablet Take 40 mg by mouth.   Yes Historical Provider, MD   spironolactone (ALDACTONE) 25 MG tablet Take 50 mg by mouth.   Yes Historical Provider, MD   albuterol (PROVENTIL) 2.5 mg /3 mL (0.083 %) nebulizer solution Inhale 1 vial into the lungs 2 (two) times daily as needed.    Historical Provider, MD   cyanocobalamin, vitamin B-12, 1,000 mcg/mL Kit Inject as directed    Historical Provider, MD     Anticoagulants/Antiplatelets: no anticoagulation    Allergies: Review of patient's allergies indicates:  No Known Allergies  Sedation History:  no adverse reactions    Review of Systems:   Hematological: no known coagulopathies  Respiratory: no shortness of breath  Cardiovascular: no chest pain  Gastrointestinal: no abdominal pain  Genito-Urinary: no dysuria  Musculoskeletal: negative  Neurological: no TIA or stroke symptoms          OBJECTIVE:     Vital Signs (Most Recent)  Temp: 97.9 °F (36.6 °C) (12/16/19 1138)  Pulse: (!) 57 (12/16/19 1138)  Resp: 20 (12/16/19 1138)  BP: (!) 143/67 (12/16/19 1138)  SpO2: 100 % (12/16/19 1138)    Physical Exam:  ASA: 3  Mallampati: 2    General: no acute distress  Mental Status: alert and oriented to person, place and time  HEENT: normocephalic, atraumatic  Chest: unlabored breathing  Heart: regular heart rate  Abdomen: nondistended  Extremity: moves all extremities    Laboratory  Lab Results   Component Value Date    INR 1.2 12/16/2019       Lab Results   Component Value Date    WBC 9.45 12/16/2019    HGB 16.0 12/16/2019    HCT 48.7 12/16/2019     (H) 12/16/2019     (L) 12/16/2019      Lab Results   Component Value Date     (H) 12/16/2019     12/16/2019    K 4.1 12/16/2019     12/16/2019    CO2 29 12/16/2019    BUN 12 12/16/2019    CREATININE 1.0 12/16/2019    CALCIUM 9.3 12/16/2019    ALT 24 12/16/2019    AST 42 (H) 12/16/2019    ALBUMIN 3.8 12/16/2019    BILITOT 2.3 (H) 12/16/2019    BILIDIR 1.0 (H) 12/16/2019       ASSESSMENT/PLAN:     Sedation Plan: moderate  Patient will undergo Y-90 pre-embolization mapping    Meagan Chan MD  Department of Radiology, PGY-2  Pager: [phone]

## 2019-12-16 NOTE — PLAN OF CARE
Pt arrived to IR room 188 for Pre Y90, no acute distress noted. Orders, consent and labs reviewed on chart.

## 2019-12-17 NOTE — NURSING
Pt received verbal and physical discharge instructions. VSS. Procedure site c/d/i and care instructions provided to the patient. Questions encouraged and answered. Pt sitting in wheelchair and discharging home with a caregiver.

## 2019-12-23 ENCOUNTER — PATIENT MESSAGE (OUTPATIENT)
Dept: HEMATOLOGY/ONCOLOGY | Facility: CLINIC | Age: 79
End: 2019-12-23

## 2019-12-23 DIAGNOSIS — C22.0 HCC (HEPATOCELLULAR CARCINOMA): Primary | ICD-10-CM

## 2019-12-31 DIAGNOSIS — C22.0 HCC (HEPATOCELLULAR CARCINOMA): Primary | ICD-10-CM

## 2019-12-31 RX ORDER — FENTANYL CITRATE 50 UG/ML
50 INJECTION, SOLUTION INTRAMUSCULAR; INTRAVENOUS
Status: CANCELLED | OUTPATIENT
Start: 2019-12-31

## 2019-12-31 RX ORDER — MIDAZOLAM HYDROCHLORIDE 1 MG/ML
1 INJECTION INTRAMUSCULAR; INTRAVENOUS
Status: CANCELLED | OUTPATIENT
Start: 2019-12-31

## 2019-12-31 RX ORDER — HEPARIN SODIUM 200 [USP'U]/100ML
500 INJECTION, SOLUTION INTRAVENOUS CONTINUOUS
Status: CANCELLED | OUTPATIENT
Start: 2019-12-31

## 2020-01-02 ENCOUNTER — PATIENT MESSAGE (OUTPATIENT)
Dept: HEMATOLOGY/ONCOLOGY | Facility: CLINIC | Age: 80
End: 2020-01-02

## 2020-01-02 ENCOUNTER — HOSPITAL ENCOUNTER (OUTPATIENT)
Facility: HOSPITAL | Age: 80
Discharge: HOME | End: 2020-01-02
Attending: RADIOLOGY | Admitting: RADIOLOGY
Payer: COMMERCIAL

## 2020-01-02 ENCOUNTER — HOSPITAL ENCOUNTER (OUTPATIENT)
Dept: RADIOLOGY | Facility: HOSPITAL | Age: 80
Discharge: HOME | End: 2020-01-02
Attending: FAMILY MEDICINE
Payer: COMMERCIAL

## 2020-01-02 VITALS
SYSTOLIC BLOOD PRESSURE: 141 MMHG | OXYGEN SATURATION: 99 % | RESPIRATION RATE: 18 BRPM | DIASTOLIC BLOOD PRESSURE: 67 MMHG | BODY MASS INDEX: 31.56 KG/M2 | HEART RATE: 58 BPM | TEMPERATURE: 98 F | HEIGHT: 72 IN | WEIGHT: 233 LBS

## 2020-01-02 DIAGNOSIS — C22.0 HCC (HEPATOCELLULAR CARCINOMA): ICD-10-CM

## 2020-01-02 PROCEDURE — 63600175 PHARM REV CODE 636 W HCPCS: Performed by: FAMILY MEDICINE

## 2020-01-02 PROCEDURE — 78201 NM LIVER IMAGING STATIC POST Y-90 EMBOLIZATION: ICD-10-PCS | Mod: 26,,, | Performed by: RADIOLOGY

## 2020-01-02 PROCEDURE — 25000003 PHARM REV CODE 250: Performed by: FAMILY MEDICINE

## 2020-01-02 PROCEDURE — 78201 LIVER IMAGING STATIC ONLY: CPT | Mod: 26,,, | Performed by: RADIOLOGY

## 2020-01-02 PROCEDURE — 78201 LIVER IMAGING STATIC ONLY: CPT | Mod: TC

## 2020-01-02 PROCEDURE — 63600175 PHARM REV CODE 636 W HCPCS: Performed by: RADIOLOGY

## 2020-01-02 RX ORDER — ONDANSETRON 4 MG/1
4 TABLET, FILM COATED ORAL EVERY 6 HOURS PRN
Qty: 20 TABLET | Refills: 0 | Status: SHIPPED | OUTPATIENT
Start: 2020-01-02 | End: 2021-01-25 | Stop reason: CLARIF

## 2020-01-02 RX ORDER — SODIUM CHLORIDE 9 MG/ML
INJECTION, SOLUTION INTRAVENOUS CONTINUOUS
Status: DISCONTINUED | OUTPATIENT
Start: 2020-01-02 | End: 2020-01-02 | Stop reason: HOSPADM

## 2020-01-02 RX ORDER — LIDOCAINE HYDROCHLORIDE 10 MG/ML
1 INJECTION, SOLUTION EPIDURAL; INFILTRATION; INTRACAUDAL; PERINEURAL ONCE AS NEEDED
Status: COMPLETED | OUTPATIENT
Start: 2020-01-02 | End: 2020-01-02

## 2020-01-02 RX ORDER — OXYCODONE HYDROCHLORIDE 5 MG/1
5 CAPSULE ORAL EVERY 4 HOURS PRN
Qty: 15 CAPSULE | Refills: 0 | Status: SHIPPED | OUTPATIENT
Start: 2020-01-02 | End: 2021-01-25 | Stop reason: CLARIF

## 2020-01-02 RX ORDER — CIPROFLOXACIN 500 MG/1
500 TABLET ORAL 2 TIMES DAILY
Qty: 14 TABLET | Refills: 0 | Status: SHIPPED | OUTPATIENT
Start: 2020-01-02 | End: 2021-01-25 | Stop reason: CLARIF

## 2020-01-02 RX ORDER — FENTANYL CITRATE 50 UG/ML
INJECTION, SOLUTION INTRAMUSCULAR; INTRAVENOUS CODE/TRAUMA/SEDATION MEDICATION
Status: COMPLETED | OUTPATIENT
Start: 2020-01-02 | End: 2020-01-02

## 2020-01-02 RX ORDER — MIDAZOLAM HYDROCHLORIDE 1 MG/ML
INJECTION INTRAMUSCULAR; INTRAVENOUS CODE/TRAUMA/SEDATION MEDICATION
Status: COMPLETED | OUTPATIENT
Start: 2020-01-02 | End: 2020-01-02

## 2020-01-02 RX ADMIN — LIDOCAINE HYDROCHLORIDE 0.2 MG: 10 INJECTION, SOLUTION EPIDURAL; INFILTRATION; INTRACAUDAL; PERINEURAL at 08:01

## 2020-01-02 RX ADMIN — SODIUM CHLORIDE: 0.9 INJECTION, SOLUTION INTRAVENOUS at 08:01

## 2020-01-02 RX ADMIN — FENTANYL CITRATE 50 MCG: 50 INJECTION, SOLUTION INTRAMUSCULAR; INTRAVENOUS at 08:01

## 2020-01-02 RX ADMIN — MIDAZOLAM HYDROCHLORIDE 1 MG: 1 INJECTION, SOLUTION INTRAMUSCULAR; INTRAVENOUS at 08:01

## 2020-01-02 RX ADMIN — AMPICILLIN SODIUM AND SULBACTAM SODIUM 3 G: 2; 1 INJECTION, POWDER, FOR SOLUTION INTRAMUSCULAR; INTRAVENOUS at 08:01

## 2020-01-02 NOTE — PROGRESS NOTES
Pt discharged per unit and hospital protocol via wheel chair with transport staff and family members.

## 2020-01-02 NOTE — PLAN OF CARE
Y-90 completed. Pt tolerated well. NAD noted or reported. Site bandaged, CDI. 5 fr exoseal deployed at this time. Pt to remain flat [id number]. Pt to be transferred to [facility] for scan then ROCU for recovery. Report to be given at bedside.

## 2020-01-02 NOTE — NURSING
Pt arrives to Ir 188 via stretcher. Pt is AAOX4, able to state reason he is here. Orders, hx, labs, consent reviewed.

## 2020-01-02 NOTE — H&P
Radiology History & Physical      SUBJECTIVE:     Chief Complaint: HCC    History of Present Illness:  Molina Lang Jr is a 79 y.o. male with HCC here for Y 90, mapping performed 12/16/19.    Past Medical History:   Diagnosis Date    Cancer     HCC; skin    COPD (chronic obstructive pulmonary disease)     Hyperlipidemia     Hypertension      Past Surgical History:   Procedure Laterality Date    CATARACT EXTRACTION      CYST REMOVAL      REPAIR OF ABDOMINAL AORTIC ANEURYSM         Home Meds:   Prior to Admission medications    Medication Sig Start Date End Date Taking? Authorizing Provider   albuterol (PROVENTIL) 2.5 mg /3 mL (0.083 %) nebulizer solution Inhale 1 vial into the lungs 2 (two) times daily as needed.    Historical Provider, MD   amLODIPine (NORVASC) 5 MG tablet Take 5 mg by mouth.    Historical Provider, MD   cyanocobalamin, vitamin B-12, 1,000 mcg/mL Kit Inject as directed    Historical Provider, MD   dextromethorphan-guaifenesin  mg (MUCINEX DM)  mg per 12 hr tablet Take 1 tablet by mouth.    Historical Provider, MD   furosemide (LASIX) 20 MG tablet Take 20 mg by mouth.    Historical Provider, MD   multivitamin (THERAGRAN) per tablet Take 1 tablet by mouth.    Historical Provider, MD   propranolol (INDERAL) 10 MG tablet Take 10 mg by mouth.    Historical Provider, MD   simvastatin (ZOCOR) 40 MG tablet Take 40 mg by mouth.    Historical Provider, MD   spironolactone (ALDACTONE) 25 MG tablet Take 50 mg by mouth.    Historical Provider, MD     Anticoagulants/Antiplatelets: no anticoagulation    Allergies: Review of patient's allergies indicates:  No Known Allergies  Sedation History:  no adverse reactions    Review of Systems:   Hematological: no known coagulopathies  Respiratory: no shortness of breath  Cardiovascular: no chest pain  Gastrointestinal: no abdominal pain  Genito-Urinary: no dysuria  Musculoskeletal: negative  Neurological: no TIA or stroke symptoms         OBJECTIVE:      Vital Signs (Most Recent)  Temp: 98 °F (36.7 °C) (01/02/20 0752)  Pulse: 67 (01/02/20 0842)  Resp: 15 (01/02/20 0842)  BP: 128/61 (01/02/20 0842)  SpO2: 97 % (01/02/20 0842)    Physical Exam:  ASA: 2  Mallampati: 2    General: no acute distress  Mental Status: alert and oriented to person, place and time  HEENT: normocephalic, atraumatic  Chest: unlabored breathing  Heart: regular heart rate  Abdomen: nondistended  Extremity: moves all extremities    Laboratory  Lab Results   Component Value Date    INR 1.2 01/02/2020       Lab Results   Component Value Date    WBC 9.95 01/02/2020    HGB 15.3 01/02/2020    HCT 47.7 01/02/2020     (H) 01/02/2020     (L) 01/02/2020      Lab Results   Component Value Date     (H) 01/02/2020     01/02/2020    K 4.0 01/02/2020     01/02/2020    CO2 31 (H) 01/02/2020    BUN 13 01/02/2020    CREATININE 1.0 01/02/2020    CALCIUM 10.0 01/02/2020    ALT 27 01/02/2020    AST 41 (H) 01/02/2020    ALBUMIN 3.8 01/02/2020    BILITOT 1.9 (H) 01/02/2020    BILIDIR 0.8 (H) 01/02/2020       ASSESSMENT/PLAN:     Sedation Plan: moderate    Patient will undergo Y90 for HCC.      Kaiden Gonzalez MD, MS  Radiology  PGY-2  Pager: [phone]

## 2020-01-02 NOTE — DISCHARGE SUMMARY
Radiology Discharge Summary      Hospital Course: No complications    Admit Date: 1/2/2020  Discharge Date: 01/02/2020     Instructions Given to Patient: Yes  Diet: Resume prior diet  Activity: activity as tolerated and no driving for today    Description of Condition on Discharge: Stable  Vital Signs (Most Recent): Temp: 97.8 °F (36.6 °C) (01/02/20 1215)  Pulse: (!) 58 (01/02/20 1215)  Resp: 18 (01/02/20 1215)  BP: (!) 141/67 (01/02/20 1215)  SpO2: 99 % (01/02/20 1215)    Discharge Disposition: Home    Discharge Diagnosis:     HCC    y 90     Follow-up:   Follow up labs in 1 month.    Hitesh Damon MD  Department of Radiology  Pager: [phone]

## 2020-01-02 NOTE — PROCEDURES
Radiology Post-Procedure Note    Pre Op Diagnosis: Hepatocellular carcinoma    Post Op Diagnosis: Same    Procedure: Yttrium treatment    Procedure performed by: Hitesh Damon MD    Written Informed Consent Obtained: Yes    Specimen Removed: No    Estimated Blood Loss: Minimal    Findings: Selective right hepatic lobe y 90 performed. Please see imaging report for full details.    Patient tolerated procedure well.    Hitesh Damon MD  Department of Radiology  Pager: [phone]

## 2020-01-03 ENCOUNTER — RESEARCH ENCOUNTER (OUTPATIENT)
Dept: RESEARCH | Facility: HOSPITAL | Age: 80
End: 2020-01-03

## 2020-01-03 NOTE — PROGRESS NOTES
Protocol: Radiation-Emitting SIR-Spheres in Non-resectable (RESIN) Liver Tumor Patient Registry  Protocol #: [id number]  Sponsor: [facility]  PI: Tee Wilson MD  IRB #: [id number]  Version Date: IRB 2/27/2018 January 3rd 2020.     Patient was consented for the study listed above on 12/16/19.   Patient then received Yttrium-90 glass microspheres after consenting, per protocol patient is a screen fail.

## 2020-01-06 DIAGNOSIS — C22.0 HCC (HEPATOCELLULAR CARCINOMA): Primary | ICD-10-CM

## 2020-02-05 ENCOUNTER — LAB VISIT (OUTPATIENT)
Dept: LAB | Facility: HOSPITAL | Age: 80
End: 2020-02-05
Payer: OTHER GOVERNMENT

## 2020-02-05 ENCOUNTER — OFFICE VISIT (OUTPATIENT)
Dept: INTERVENTIONAL RADIOLOGY/VASCULAR | Facility: CLINIC | Age: 80
End: 2020-02-05
Payer: OTHER GOVERNMENT

## 2020-02-05 VITALS
HEART RATE: 57 BPM | BODY MASS INDEX: 31.53 KG/M2 | DIASTOLIC BLOOD PRESSURE: 66 MMHG | WEIGHT: 232.81 LBS | HEIGHT: 72 IN | SYSTOLIC BLOOD PRESSURE: 123 MMHG

## 2020-02-05 DIAGNOSIS — C22.0 HCC (HEPATOCELLULAR CARCINOMA): ICD-10-CM

## 2020-02-05 DIAGNOSIS — C22.0 HCC (HEPATOCELLULAR CARCINOMA): Primary | ICD-10-CM

## 2020-02-05 LAB
AFP SERPL-MCNC: 8 NG/ML (ref 0–8.4)
ALBUMIN SERPL BCP-MCNC: 3.7 G/DL (ref 3.5–5.2)
ALP SERPL-CCNC: 173 U/L (ref 55–135)
ALT SERPL W/O P-5'-P-CCNC: 23 U/L (ref 10–44)
ANION GAP SERPL CALC-SCNC: 7 MMOL/L (ref 8–16)
AST SERPL-CCNC: 42 U/L (ref 10–40)
BILIRUB SERPL-MCNC: 1.3 MG/DL (ref 0.1–1)
BUN SERPL-MCNC: 11 MG/DL (ref 8–23)
CALCIUM SERPL-MCNC: 9.3 MG/DL (ref 8.7–10.5)
CHLORIDE SERPL-SCNC: 104 MMOL/L (ref 95–110)
CO2 SERPL-SCNC: 28 MMOL/L (ref 23–29)
CREAT SERPL-MCNC: 0.9 MG/DL (ref 0.5–1.4)
EST. GFR  (AFRICAN AMERICAN): >60 ML/MIN/1.73 M^2
EST. GFR  (NON AFRICAN AMERICAN): >60 ML/MIN/1.73 M^2
GLUCOSE SERPL-MCNC: 120 MG/DL (ref 70–110)
POTASSIUM SERPL-SCNC: 4.2 MMOL/L (ref 3.5–5.1)
PROT SERPL-MCNC: 7.2 G/DL (ref 6–8.4)
SODIUM SERPL-SCNC: 139 MMOL/L (ref 136–145)

## 2020-02-05 PROCEDURE — 99213 OFFICE O/P EST LOW 20 MIN: CPT | Mod: PBBFAC

## 2020-02-05 PROCEDURE — 82105 ALPHA-FETOPROTEIN SERUM: CPT

## 2020-02-05 PROCEDURE — 99213 OFFICE O/P EST LOW 20 MIN: CPT | Mod: S$PBB,,, | Performed by: RADIOLOGY

## 2020-02-05 PROCEDURE — 99213 PR OFFICE/OUTPT VISIT, EST, LEVL III, 20-29 MIN: ICD-10-PCS | Mod: S$PBB,,, | Performed by: RADIOLOGY

## 2020-02-05 PROCEDURE — 99999 PR PBB SHADOW E&M-EST. PATIENT-LVL III: ICD-10-PCS | Mod: PBBFAC,,,

## 2020-02-05 PROCEDURE — 80053 COMPREHEN METABOLIC PANEL: CPT

## 2020-02-05 PROCEDURE — 36415 COLL VENOUS BLD VENIPUNCTURE: CPT

## 2020-02-05 PROCEDURE — 99999 PR PBB SHADOW E&M-EST. PATIENT-LVL III: CPT | Mod: PBBFAC,,,

## 2020-02-10 NOTE — PROGRESS NOTES
Radiology Pre-procedure Note    History of Present Illness:  Molina Lang Jr is a 79 y.o. male who presents for follow-up after y90 administration. Patient reports feeling well with return to baseline activity. No evidence of jaundice or fatigue. Reports appetite normal.     Admission H&P reviewed.  Past Medical History:   Diagnosis Date    Cancer     HCC; skin    COPD (chronic obstructive pulmonary disease)     Hyperlipidemia     Hypertension      Past Surgical History:   Procedure Laterality Date    CATARACT EXTRACTION      CYST REMOVAL      REPAIR OF ABDOMINAL AORTIC ANEURYSM         Review of Systems:   As documented in primary team H&P    Home Meds:   Prior to Admission medications    Medication Sig Start Date End Date Taking? Authorizing Provider   albuterol (PROVENTIL) 2.5 mg /3 mL (0.083 %) nebulizer solution Inhale 1 vial into the lungs 2 (two) times daily as needed.    Historical Provider, MD   amLODIPine (NORVASC) 5 MG tablet Take 5 mg by mouth.    Historical Provider, MD   ciprofloxacin HCl (CIPRO) 500 MG tablet Take 1 tablet (500 mg total) by mouth 2 (two) times daily.  Patient not taking: Reported on 2/5/2020 1/2/20   Kaiden Gonzalez MD   cyanocobalamin, vitamin B-12, 1,000 mcg/mL Kit Inject as directed    Historical Provider, MD   dextromethorphan-guaifenesin  mg (MUCINEX DM)  mg per 12 hr tablet Take 1 tablet by mouth.    Historical Provider, MD   furosemide (LASIX) 20 MG tablet Take 20 mg by mouth.    Historical Provider, MD   multivitamin (THERAGRAN) per tablet Take 1 tablet by mouth.    Historical Provider, MD   ondansetron (ZOFRAN) 4 MG tablet Take 1 tablet (4 mg total) by mouth every 6 (six) hours as needed for Nausea.  Patient not taking: Reported on 2/5/2020 1/2/20   Kaiden Gonzalez MD   oxyCODONE (OXY-IR) 5 mg Cap Take 1 capsule (5 mg total) by mouth every 4 (four) hours as needed for Pain.  Patient not taking: Reported on 2/5/2020 1/2/20   Kaiden  Yareli Gonzalez MD   propranolol (INDERAL) 10 MG tablet Take 10 mg by mouth.    Historical Provider, MD   simvastatin (ZOCOR) 40 MG tablet Take 40 mg by mouth.    Historical Provider, MD   spironolactone (ALDACTONE) 25 MG tablet Take 50 mg by mouth.    Historical Provider, MD     Scheduled Meds:   Continuous Infusions:   PRN Meds:  Anticoagulants/Antiplatelets: no anticoagulation    Allergies: Review of patient's allergies indicates:  No Known Allergies  Sedation Hx: have not been any systemic reactions      Vitals:  Pulse: (!) 57 (02/05/20 1051)  BP: 123/66 (02/05/20 1051)     Physical Exam:  ASA: 2  Mallampati: 2    General: no acute distress  Mental Status: alert and oriented to person, place and time  HEENT: normocephalic, atraumatic  Chest: unlabored breathing      Plan: Patient is a 79 year old s/p y90 here for initial follow up. Patient AFP stable and doing well, will obtain repeat MRI and plan for future treatment.     .IManjit M.D. personally reviewed and agree with the above dictated note.

## 2020-03-04 ENCOUNTER — PATIENT MESSAGE (OUTPATIENT)
Dept: HEMATOLOGY/ONCOLOGY | Facility: CLINIC | Age: 80
End: 2020-03-04

## 2020-03-04 ENCOUNTER — TELEPHONE (OUTPATIENT)
Dept: INTERVENTIONAL RADIOLOGY/VASCULAR | Facility: HOSPITAL | Age: 80
End: 2020-03-04

## 2020-03-04 NOTE — TELEPHONE ENCOUNTER
Spoke to patient to schedule MRI. Patient tells me he is driving. We will contact him tomorrow to schedule his MRI.

## 2020-03-06 ENCOUNTER — HOSPITAL ENCOUNTER (OUTPATIENT)
Dept: RADIOLOGY | Facility: HOSPITAL | Age: 80
Discharge: HOME | End: 2020-03-06
Attending: RADIOLOGY
Payer: OTHER GOVERNMENT

## 2020-03-06 DIAGNOSIS — C22.0 HCC (HEPATOCELLULAR CARCINOMA): ICD-10-CM

## 2020-03-06 PROCEDURE — 74183 MRI ABD W/O CNTR FLWD CNTR: CPT | Mod: TC

## 2020-03-06 PROCEDURE — 74183 MRI ABDOMEN W WO CONTRAST: ICD-10-PCS | Mod: 26,,, | Performed by: RADIOLOGY

## 2020-03-06 PROCEDURE — A9585 GADOBUTROL INJECTION: HCPCS | Performed by: RADIOLOGY

## 2020-03-06 PROCEDURE — 25500020 PHARM REV CODE 255: Performed by: RADIOLOGY

## 2020-03-06 PROCEDURE — 74183 MRI ABD W/O CNTR FLWD CNTR: CPT | Mod: 26,,, | Performed by: RADIOLOGY

## 2020-03-06 RX ORDER — GADOBUTROL 604.72 MG/ML
10 INJECTION INTRAVENOUS
Status: COMPLETED | OUTPATIENT
Start: 2020-03-06 | End: 2020-03-06

## 2020-03-06 RX ADMIN — GADOBUTROL 10 ML: 604.72 INJECTION INTRAVENOUS at 10:03

## 2020-04-09 ENCOUNTER — CLINICAL SUPPORT (OUTPATIENT)
Dept: INTERVENTIONAL RADIOLOGY/VASCULAR | Facility: CLINIC | Age: 80
End: 2020-04-09
Payer: COMMERCIAL

## 2020-04-09 DIAGNOSIS — C22.0 HCC (HEPATOCELLULAR CARCINOMA): Primary | ICD-10-CM

## 2020-04-09 PROCEDURE — 99213 PR OFFICE/OUTPT VISIT, EST, LEVL III, 20-29 MIN: ICD-10-PCS | Mod: 95,,, | Performed by: RADIOLOGY

## 2020-04-09 PROCEDURE — 99213 OFFICE O/P EST LOW 20 MIN: CPT | Mod: 95,,, | Performed by: RADIOLOGY

## 2020-04-09 NOTE — PROGRESS NOTES
"Subjective:       Patient ID: Molina Lang Jr is a 79 y.o. male.    Chief Complaint: No chief complaint on file.    Patient seen in Interventional Radiology clinic to discuss further treatment options for hepatocellular carcinoma.     Patient was originally diagnosed with HCC in 2016 and subsequently treated with ablation at an outside facility ([facility]). During routine surveillance with MRI in August 2019, multiple enhancing lesions were noted. MRI on 10/31/2019 noted 3 lesions that were consistent with hepatocellular carcinoma. January 2020 he underwent right lobe Y90 treatment with Dr. Damon.      Recent MRI 2/10/20 shows "There are embolization cavities within segments 5 and 8 without evidence of nodular enhancement or washout to suggest recurrent or residual tumor.    Within hepatic segment 5 there is a 1.7 cm arterially enhancing lesion with questionable associated washout (series 9, image 52), previously measuring 1.2 cm.    Within hepatic segment 7 there is a 2.5 cm arterially enhancing lesion with washout (series 9, image 38), previously measuring 2.5 cm, adjacent to a hypoenhancing lesion measuring 1.8 cm which likely represents a treated lesion."    Review of Systems   Constitutional: Negative for activity change, appetite change and fever.   HENT: Negative for congestion.    Respiratory: Positive for shortness of breath. Negative for cough.    Gastrointestinal: Negative for abdominal distention and abdominal pain.   Allergic/Immunologic: Negative for environmental allergies and food allergies.   Neurological: Negative for numbness and headaches.       Objective:      Physical Exam   Constitutional: He is oriented to person, place, and time. He appears well-developed and well-nourished.   HENT:   Head: Normocephalic and atraumatic.   Eyes: EOM are normal.   Neck: Normal range of motion.   Pulmonary/Chest: Effort normal.   Neurological: He is alert and oriented to person, place, and time. "   Psychiatric: He has a normal mood and affect. His behavior is normal. Judgment and thought content normal.                Assessment:       1. HCC (hepatocellular carcinoma)        Plan:       The patient location is: [place]  The chief complaint leading to consultation is: liver lesion  Visit type: Virtual visit with synchronous audio and video  Total time spent with patient: 30 minutes  Each patient to whom he or she provides medical services by telemedicine is:  (1) informed of the relationship between the physician and patient and the respective role of any other health care provider with respect to management of the patient; and (2) notified that he or she may decline to receive medical services by telemedicine and may withdraw from such care at any time.    Notes:   Case discussed with Dr. Damon.    Discussed how the procedure will be performed, risks (including, but not limited to, pain, bleeding, infection, damage to nearby structures, and the need for additional procedures), benefits, possible complications, pre-post procedure expectations, and alternatives. The patient voices understanding and all questions have been answered.  The patient agrees to proceed as planned. Patient will be called by DECLAN Cat for scheduling. Clinic phone number provided.

## 2020-04-13 DIAGNOSIS — C22.0 HCC (HEPATOCELLULAR CARCINOMA): Primary | ICD-10-CM

## 2020-04-20 ENCOUNTER — TELEPHONE (OUTPATIENT)
Dept: INTERVENTIONAL RADIOLOGY/VASCULAR | Facility: CLINIC | Age: 80
End: 2020-04-20

## 2020-04-20 NOTE — TELEPHONE ENCOUNTER
Returned pt's significant other,Gilma, she stated that pt fell Saturday and broke his hip and needs to cancel IR procedure. Will call back when ready. Thanks

## 2020-08-06 DIAGNOSIS — C22.0 HCC (HEPATOCELLULAR CARCINOMA): Primary | ICD-10-CM

## 2020-12-14 DIAGNOSIS — C22.0 HCC (HEPATOCELLULAR CARCINOMA): Primary | ICD-10-CM

## 2020-12-23 ENCOUNTER — HOSPITAL ENCOUNTER (OUTPATIENT)
Dept: RADIOLOGY | Facility: HOSPITAL | Age: 80
Discharge: HOME | End: 2020-12-23
Attending: PHYSICIAN ASSISTANT
Payer: COMMERCIAL

## 2020-12-23 DIAGNOSIS — C22.0 HCC (HEPATOCELLULAR CARCINOMA): ICD-10-CM

## 2020-12-23 LAB
CREAT SERPL-MCNC: 1.1 MG/DL (ref 0.5–1.4)
SAMPLE: NORMAL

## 2020-12-23 PROCEDURE — 74183 MRI ABDOMEN W WO CONTRAST: ICD-10-PCS | Mod: 26,,, | Performed by: RADIOLOGY

## 2020-12-23 PROCEDURE — 74183 MRI ABD W/O CNTR FLWD CNTR: CPT | Mod: 26,,, | Performed by: RADIOLOGY

## 2020-12-23 PROCEDURE — 25500020 PHARM REV CODE 255: Performed by: PHYSICIAN ASSISTANT

## 2020-12-23 PROCEDURE — A9585 GADOBUTROL INJECTION: HCPCS | Performed by: PHYSICIAN ASSISTANT

## 2020-12-23 PROCEDURE — 74183 MRI ABD W/O CNTR FLWD CNTR: CPT | Mod: TC

## 2020-12-23 RX ORDER — GADOBUTROL 604.72 MG/ML
10 INJECTION INTRAVENOUS
Status: COMPLETED | OUTPATIENT
Start: 2020-12-23 | End: 2020-12-23

## 2020-12-23 RX ADMIN — GADOBUTROL 10 ML: 604.72 INJECTION INTRAVENOUS at 02:12

## 2020-12-24 DIAGNOSIS — C22.0 HCC (HEPATOCELLULAR CARCINOMA): Primary | ICD-10-CM

## 2021-01-05 ENCOUNTER — OFFICE VISIT (OUTPATIENT)
Dept: INTERVENTIONAL RADIOLOGY/VASCULAR | Facility: CLINIC | Age: 81
End: 2021-01-05
Payer: OTHER GOVERNMENT

## 2021-01-05 ENCOUNTER — LAB VISIT (OUTPATIENT)
Dept: LAB | Facility: HOSPITAL | Age: 81
End: 2021-01-05
Payer: OTHER GOVERNMENT

## 2021-01-05 VITALS
DIASTOLIC BLOOD PRESSURE: 69 MMHG | HEIGHT: 72 IN | WEIGHT: 240.06 LBS | HEART RATE: 65 BPM | SYSTOLIC BLOOD PRESSURE: 137 MMHG | BODY MASS INDEX: 32.52 KG/M2

## 2021-01-05 DIAGNOSIS — C22.0 HCC (HEPATOCELLULAR CARCINOMA): ICD-10-CM

## 2021-01-05 DIAGNOSIS — C22.0 HCC (HEPATOCELLULAR CARCINOMA): Primary | ICD-10-CM

## 2021-01-05 LAB
ALBUMIN SERPL BCP-MCNC: 3.7 G/DL (ref 3.5–5.2)
ALP SERPL-CCNC: 135 U/L (ref 55–135)
ALT SERPL W/O P-5'-P-CCNC: 22 U/L (ref 10–44)
ANION GAP SERPL CALC-SCNC: 7 MMOL/L (ref 8–16)
AST SERPL-CCNC: 38 U/L (ref 10–40)
BILIRUB SERPL-MCNC: 1.7 MG/DL (ref 0.1–1)
BUN SERPL-MCNC: 13 MG/DL (ref 8–23)
CALCIUM SERPL-MCNC: 8.9 MG/DL (ref 8.7–10.5)
CHLORIDE SERPL-SCNC: 101 MMOL/L (ref 95–110)
CO2 SERPL-SCNC: 29 MMOL/L (ref 23–29)
CREAT SERPL-MCNC: 1 MG/DL (ref 0.5–1.4)
CREAT SERPL-MCNC: 1 MG/DL (ref 0.5–1.4)
EST. GFR  (AFRICAN AMERICAN): >60 ML/MIN/1.73 M^2
EST. GFR  (AFRICAN AMERICAN): >60 ML/MIN/1.73 M^2
EST. GFR  (NON AFRICAN AMERICAN): >60 ML/MIN/1.73 M^2
EST. GFR  (NON AFRICAN AMERICAN): >60 ML/MIN/1.73 M^2
GLUCOSE SERPL-MCNC: 126 MG/DL (ref 70–110)
POTASSIUM SERPL-SCNC: 4 MMOL/L (ref 3.5–5.1)
PROT SERPL-MCNC: 7.3 G/DL (ref 6–8.4)
SODIUM SERPL-SCNC: 137 MMOL/L (ref 136–145)

## 2021-01-05 PROCEDURE — 80053 COMPREHEN METABOLIC PANEL: CPT

## 2021-01-05 PROCEDURE — 99999 PR PBB SHADOW E&M-EST. PATIENT-LVL IV: CPT | Mod: PBBFAC,,, | Performed by: FAMILY MEDICINE

## 2021-01-05 PROCEDURE — 36415 COLL VENOUS BLD VENIPUNCTURE: CPT

## 2021-01-05 PROCEDURE — 99999 PR PBB SHADOW E&M-EST. PATIENT-LVL IV: ICD-10-PCS | Mod: PBBFAC,,, | Performed by: FAMILY MEDICINE

## 2021-01-05 PROCEDURE — 99213 OFFICE O/P EST LOW 20 MIN: CPT | Mod: S$PBB,,, | Performed by: FAMILY MEDICINE

## 2021-01-05 PROCEDURE — 99214 OFFICE O/P EST MOD 30 MIN: CPT | Mod: PBBFAC | Performed by: FAMILY MEDICINE

## 2021-01-05 PROCEDURE — 99213 PR OFFICE/OUTPT VISIT, EST, LEVL III, 20-29 MIN: ICD-10-PCS | Mod: S$PBB,,, | Performed by: FAMILY MEDICINE

## 2021-01-18 ENCOUNTER — PATIENT MESSAGE (OUTPATIENT)
Dept: HEMATOLOGY/ONCOLOGY | Facility: CLINIC | Age: 81
End: 2021-01-18

## 2021-01-21 DIAGNOSIS — C22.0 HCC (HEPATOCELLULAR CARCINOMA): Primary | ICD-10-CM

## 2021-01-21 RX ORDER — HEPARIN SODIUM 200 [USP'U]/100ML
1000 INJECTION, SOLUTION INTRAVENOUS CONTINUOUS
Status: CANCELLED | OUTPATIENT
Start: 2021-01-21

## 2021-01-21 RX ORDER — MIDAZOLAM HYDROCHLORIDE 1 MG/ML
1 INJECTION INTRAMUSCULAR; INTRAVENOUS
Status: CANCELLED | OUTPATIENT
Start: 2021-01-21

## 2021-01-21 RX ORDER — FENTANYL CITRATE 50 UG/ML
50 INJECTION, SOLUTION INTRAMUSCULAR; INTRAVENOUS
Status: CANCELLED | OUTPATIENT
Start: 2021-01-21

## 2021-01-25 ENCOUNTER — HOSPITAL ENCOUNTER (OUTPATIENT)
Dept: RADIOLOGY | Facility: HOSPITAL | Age: 81
Discharge: HOME | End: 2021-01-25
Attending: FAMILY MEDICINE
Payer: OTHER GOVERNMENT

## 2021-01-25 ENCOUNTER — HOSPITAL ENCOUNTER (OUTPATIENT)
Dept: INTERVENTIONAL RADIOLOGY/VASCULAR | Facility: HOSPITAL | Age: 81
Discharge: HOME | End: 2021-01-25
Attending: FAMILY MEDICINE
Payer: OTHER GOVERNMENT

## 2021-01-25 VITALS
OXYGEN SATURATION: 98 % | HEIGHT: 72 IN | HEART RATE: 65 BPM | SYSTOLIC BLOOD PRESSURE: 140 MMHG | WEIGHT: 240.06 LBS | RESPIRATION RATE: 18 BRPM | TEMPERATURE: 98 F | BODY MASS INDEX: 32.52 KG/M2 | DIASTOLIC BLOOD PRESSURE: 70 MMHG

## 2021-01-25 DIAGNOSIS — C22.0 HCC (HEPATOCELLULAR CARCINOMA): ICD-10-CM

## 2021-01-25 PROCEDURE — 77290 PR  SET RADN THERAPY FIELD COMPLEX: ICD-10-PCS | Mod: 26,,, | Performed by: RADIOLOGY

## 2021-01-25 PROCEDURE — 76937 US GUIDE VASCULAR ACCESS: CPT | Mod: TC | Performed by: RADIOLOGY

## 2021-01-25 PROCEDURE — 99153 MOD SED SAME PHYS/QHP EA: CPT | Performed by: RADIOLOGY

## 2021-01-25 PROCEDURE — 78830 NM FUSION SPECT CT: ICD-10-PCS | Mod: 26,,, | Performed by: RADIOLOGY

## 2021-01-25 PROCEDURE — 77263 THER RADIOLOGY TX PLNG CPLX: CPT | Mod: ,,, | Performed by: RADIOLOGY

## 2021-01-25 PROCEDURE — 75726 ARTERY X-RAYS ABDOMEN: CPT | Mod: 26,,, | Performed by: RADIOLOGY

## 2021-01-25 PROCEDURE — 76937 PR  US GUIDE, VASCULAR ACCESS: ICD-10-PCS | Mod: 26,,, | Performed by: RADIOLOGY

## 2021-01-25 PROCEDURE — 63600175 PHARM REV CODE 636 W HCPCS: Performed by: RADIOLOGY

## 2021-01-25 PROCEDURE — G0269 OCCLUSIVE DEVICE IN VEIN ART: HCPCS | Performed by: RADIOLOGY

## 2021-01-25 PROCEDURE — 78830 RP LOCLZJ TUM SPECT W/CT 1: CPT | Mod: 26,,, | Performed by: RADIOLOGY

## 2021-01-25 PROCEDURE — 36248 INS CATH ABD/L-EXT ART ADDL: CPT | Mod: ,,, | Performed by: RADIOLOGY

## 2021-01-25 PROCEDURE — 75774 ARTERY X-RAY EACH VESSEL: CPT | Mod: TC | Performed by: RADIOLOGY

## 2021-01-25 PROCEDURE — 36247 INS CATH ABD/L-EXT ART 3RD: CPT | Performed by: RADIOLOGY

## 2021-01-25 PROCEDURE — 76380 CAT SCAN FOLLOW-UP STUDY: CPT | Mod: 26,,, | Performed by: RADIOLOGY

## 2021-01-25 PROCEDURE — 76377 3D RENDER W/INTRP POSTPROCES: CPT | Mod: TC | Performed by: RADIOLOGY

## 2021-01-25 PROCEDURE — 78201 NM LIVER IMAGING STATIC PRE Y-90 EMBOLIZATION: ICD-10-PCS | Mod: 26,,, | Performed by: RADIOLOGY

## 2021-01-25 PROCEDURE — 99152 MOD SED SAME PHYS/QHP 5/>YRS: CPT | Performed by: RADIOLOGY

## 2021-01-25 PROCEDURE — 36248 INS CATH ABD/L-EXT ART ADDL: CPT | Performed by: RADIOLOGY

## 2021-01-25 PROCEDURE — 76380 PR  CT SCAN,LIMITED/LOCALIZED F/U STUDY: ICD-10-PCS | Mod: 26,,, | Performed by: RADIOLOGY

## 2021-01-25 PROCEDURE — 76377 PR  3D RENDERING W/ IMAGE POSTPROCESS: ICD-10-PCS | Mod: 26,,, | Performed by: RADIOLOGY

## 2021-01-25 PROCEDURE — 25000003 PHARM REV CODE 250: Performed by: PHYSICIAN ASSISTANT

## 2021-01-25 PROCEDURE — 76377 3D RENDER W/INTRP POSTPROCES: CPT | Mod: 26,,, | Performed by: RADIOLOGY

## 2021-01-25 PROCEDURE — 36247 IR EMBOLIZATION COMP FOR TUMOR_ORGAN ISCHEMIA_INFARC: ICD-10-PCS | Mod: ,,, | Performed by: RADIOLOGY

## 2021-01-25 PROCEDURE — 99153 MOD SED SAME PHYS/QHP EA: CPT

## 2021-01-25 PROCEDURE — 76380 CAT SCAN FOLLOW-UP STUDY: CPT | Mod: TC | Performed by: RADIOLOGY

## 2021-01-25 PROCEDURE — 75774 PR  ANGIO EA ADDNL SELECTV VESSEL: ICD-10-PCS | Mod: 26,,, | Performed by: RADIOLOGY

## 2021-01-25 PROCEDURE — 78201 LIVER IMAGING STATIC ONLY: CPT | Mod: TC

## 2021-01-25 PROCEDURE — 75774 ARTERY X-RAY EACH VESSEL: CPT | Mod: 26,,, | Performed by: RADIOLOGY

## 2021-01-25 PROCEDURE — C1894 INTRO/SHEATH, NON-LASER: HCPCS

## 2021-01-25 PROCEDURE — 77263 PR  RADIATION THERAPY PLAN COMPLEX: ICD-10-PCS | Mod: ,,, | Performed by: RADIOLOGY

## 2021-01-25 PROCEDURE — 75726 CHG ANGIO VISCERAL SELECTV/SUBSELEC: ICD-10-PCS | Mod: 26,,, | Performed by: RADIOLOGY

## 2021-01-25 PROCEDURE — 99152 MOD SED SAME PHYS/QHP 5/>YRS: CPT

## 2021-01-25 PROCEDURE — 25500020 PHARM REV CODE 255: Performed by: RADIOLOGY

## 2021-01-25 PROCEDURE — 78201 LIVER IMAGING STATIC ONLY: CPT | Mod: 26,,, | Performed by: RADIOLOGY

## 2021-01-25 PROCEDURE — 36248 PR PR INS CATH ABD/L-EXT ART ADDL 2ND ORD/3RD ORD/BYD: ICD-10-PCS | Mod: ,,, | Performed by: RADIOLOGY

## 2021-01-25 PROCEDURE — 77290 THER RAD SIMULAJ FIELD CPLX: CPT | Mod: TC | Performed by: RADIOLOGY

## 2021-01-25 PROCEDURE — 77290 THER RAD SIMULAJ FIELD CPLX: CPT | Mod: 26,,, | Performed by: RADIOLOGY

## 2021-01-25 PROCEDURE — 78999 UNLISTED MISC PX DX NUC MED: CPT | Mod: TC

## 2021-01-25 PROCEDURE — 76937 US GUIDE VASCULAR ACCESS: CPT | Mod: 26,,, | Performed by: RADIOLOGY

## 2021-01-25 PROCEDURE — 75726 ARTERY X-RAYS ABDOMEN: CPT | Mod: TC | Performed by: RADIOLOGY

## 2021-01-25 RX ORDER — HEPARIN SODIUM 200 [USP'U]/100ML
INJECTION, SOLUTION INTRAVENOUS
Status: COMPLETED | OUTPATIENT
Start: 2021-01-25 | End: 2021-01-25

## 2021-01-25 RX ORDER — LIDOCAINE HYDROCHLORIDE 10 MG/ML
1 INJECTION, SOLUTION EPIDURAL; INFILTRATION; INTRACAUDAL; PERINEURAL ONCE AS NEEDED
Status: COMPLETED | OUTPATIENT
Start: 2021-01-25 | End: 2021-01-25

## 2021-01-25 RX ORDER — MIDAZOLAM HYDROCHLORIDE 1 MG/ML
INJECTION INTRAMUSCULAR; INTRAVENOUS CODE/TRAUMA/SEDATION MEDICATION
Status: COMPLETED | OUTPATIENT
Start: 2021-01-25 | End: 2021-01-25

## 2021-01-25 RX ORDER — SODIUM CHLORIDE 9 MG/ML
INJECTION, SOLUTION INTRAVENOUS CONTINUOUS
Status: DISCONTINUED | OUTPATIENT
Start: 2021-01-25 | End: 2021-01-26 | Stop reason: HOSPADM

## 2021-01-25 RX ORDER — FENTANYL CITRATE 50 UG/ML
INJECTION, SOLUTION INTRAMUSCULAR; INTRAVENOUS CODE/TRAUMA/SEDATION MEDICATION
Status: COMPLETED | OUTPATIENT
Start: 2021-01-25 | End: 2021-01-25

## 2021-01-25 RX ADMIN — HEPARIN SODIUM IN SODIUM CHLORIDE 250 UNITS/HR: 200 INJECTION INTRAVENOUS at 10:01

## 2021-01-25 RX ADMIN — IOHEXOL 80 ML: 300 INJECTION, SOLUTION INTRAVENOUS at 12:01

## 2021-01-25 RX ADMIN — FENTANYL CITRATE 50 MCG: 50 INJECTION, SOLUTION INTRAMUSCULAR; INTRAVENOUS at 11:01

## 2021-01-25 RX ADMIN — LIDOCAINE HYDROCHLORIDE 1 MG: 10 INJECTION, SOLUTION EPIDURAL; INFILTRATION; INTRACAUDAL at 09:01

## 2021-01-25 RX ADMIN — SODIUM CHLORIDE: 0.9 INJECTION, SOLUTION INTRAVENOUS at 09:01

## 2021-01-25 RX ADMIN — FENTANYL CITRATE 50 MCG: 50 INJECTION, SOLUTION INTRAMUSCULAR; INTRAVENOUS at 10:01

## 2021-01-25 RX ADMIN — MIDAZOLAM HYDROCHLORIDE 1 MG: 1 INJECTION, SOLUTION INTRAMUSCULAR; INTRAVENOUS at 10:01

## 2021-01-28 ENCOUNTER — TELEPHONE (OUTPATIENT)
Dept: INTERVENTIONAL RADIOLOGY/VASCULAR | Facility: HOSPITAL | Age: 81
End: 2021-01-28

## 2021-03-04 DIAGNOSIS — C22.0 HCC (HEPATOCELLULAR CARCINOMA): Primary | ICD-10-CM

## 2021-03-08 ENCOUNTER — HOSPITAL ENCOUNTER (OUTPATIENT)
Dept: RADIOLOGY | Facility: HOSPITAL | Age: 81
Discharge: HOME | End: 2021-03-08
Attending: FAMILY MEDICINE
Payer: OTHER GOVERNMENT

## 2021-03-08 ENCOUNTER — HOSPITAL ENCOUNTER (OUTPATIENT)
Dept: INTERVENTIONAL RADIOLOGY/VASCULAR | Facility: HOSPITAL | Age: 81
Discharge: HOME | End: 2021-03-08
Attending: FAMILY MEDICINE
Payer: OTHER GOVERNMENT

## 2021-03-08 VITALS
RESPIRATION RATE: 18 BRPM | WEIGHT: 240 LBS | OXYGEN SATURATION: 98 % | HEART RATE: 57 BPM | TEMPERATURE: 98 F | SYSTOLIC BLOOD PRESSURE: 137 MMHG | DIASTOLIC BLOOD PRESSURE: 55 MMHG | BODY MASS INDEX: 32.55 KG/M2

## 2021-03-08 DIAGNOSIS — C22.0 HCC (HEPATOCELLULAR CARCINOMA): ICD-10-CM

## 2021-03-08 PROCEDURE — 78830 NM FUSION SPECT CT: ICD-10-PCS | Mod: 26,,, | Performed by: RADIOLOGY

## 2021-03-08 PROCEDURE — G0269 OCCLUSIVE DEVICE IN VEIN ART: HCPCS | Performed by: RADIOLOGY

## 2021-03-08 PROCEDURE — 78201 NM LIVER IMAGING STATIC POST Y-90 EMBOLIZATION: ICD-10-PCS | Mod: 26,59,, | Performed by: RADIOLOGY

## 2021-03-08 PROCEDURE — 63600175 PHARM REV CODE 636 W HCPCS: Performed by: RADIOLOGY

## 2021-03-08 PROCEDURE — 76937 US GUIDE VASCULAR ACCESS: CPT | Mod: TC | Performed by: RADIOLOGY

## 2021-03-08 PROCEDURE — 36248 INS CATH ABD/L-EXT ART ADDL: CPT | Mod: RT | Performed by: RADIOLOGY

## 2021-03-08 PROCEDURE — 78201 LIVER IMAGING STATIC ONLY: CPT | Mod: TC

## 2021-03-08 PROCEDURE — 36247 INS CATH ABD/L-EXT ART 3RD: CPT | Mod: 59 | Performed by: RADIOLOGY

## 2021-03-08 PROCEDURE — C1894 INTRO/SHEATH, NON-LASER: HCPCS

## 2021-03-08 PROCEDURE — 99153 MOD SED SAME PHYS/QHP EA: CPT | Performed by: RADIOLOGY

## 2021-03-08 PROCEDURE — 78830 RP LOCLZJ TUM SPECT W/CT 1: CPT | Mod: TC

## 2021-03-08 PROCEDURE — 99152 MOD SED SAME PHYS/QHP 5/>YRS: CPT | Performed by: RADIOLOGY

## 2021-03-08 PROCEDURE — 79445 NUCLEAR RX INTRA-ARTERIAL: CPT | Mod: TC | Performed by: RADIOLOGY

## 2021-03-08 PROCEDURE — 77300 RADIATION THERAPY DOSE PLAN: CPT | Mod: TC | Performed by: RADIOLOGY

## 2021-03-08 PROCEDURE — 78201 LIVER IMAGING STATIC ONLY: CPT | Mod: 26,59,, | Performed by: RADIOLOGY

## 2021-03-08 PROCEDURE — 37243 VASC EMBOLIZE/OCCLUDE ORGAN: CPT | Performed by: RADIOLOGY

## 2021-03-08 PROCEDURE — 75774 ARTERY X-RAY EACH VESSEL: CPT | Mod: TC,59 | Performed by: RADIOLOGY

## 2021-03-08 PROCEDURE — 78830 RP LOCLZJ TUM SPECT W/CT 1: CPT | Mod: 26,,, | Performed by: RADIOLOGY

## 2021-03-08 PROCEDURE — 25000003 PHARM REV CODE 250: Performed by: RADIOLOGY

## 2021-03-08 PROCEDURE — 75726 ARTERY X-RAYS ABDOMEN: CPT | Mod: TC,59 | Performed by: RADIOLOGY

## 2021-03-08 PROCEDURE — 25500020 PHARM REV CODE 255: Performed by: RADIOLOGY

## 2021-03-08 RX ORDER — HEPARIN SODIUM 200 [USP'U]/100ML
1000 INJECTION, SOLUTION INTRAVENOUS CONTINUOUS
Status: DISCONTINUED | OUTPATIENT
Start: 2021-03-08 | End: 2021-03-09 | Stop reason: HOSPADM

## 2021-03-08 RX ORDER — LIDOCAINE HYDROCHLORIDE 10 MG/ML
INJECTION INFILTRATION; PERINEURAL CODE/TRAUMA/SEDATION MEDICATION
Status: COMPLETED | OUTPATIENT
Start: 2021-03-08 | End: 2021-03-08

## 2021-03-08 RX ORDER — SODIUM CHLORIDE 9 MG/ML
INJECTION, SOLUTION INTRAVENOUS CONTINUOUS
Status: DISCONTINUED | OUTPATIENT
Start: 2021-03-08 | End: 2021-03-09 | Stop reason: HOSPADM

## 2021-03-08 RX ORDER — FENTANYL CITRATE 50 UG/ML
50 INJECTION, SOLUTION INTRAMUSCULAR; INTRAVENOUS
Status: DISCONTINUED | OUTPATIENT
Start: 2021-03-08 | End: 2021-03-09 | Stop reason: HOSPADM

## 2021-03-08 RX ORDER — FENTANYL CITRATE 50 UG/ML
INJECTION, SOLUTION INTRAMUSCULAR; INTRAVENOUS CODE/TRAUMA/SEDATION MEDICATION
Status: COMPLETED | OUTPATIENT
Start: 2021-03-08 | End: 2021-03-08

## 2021-03-08 RX ORDER — DEXAMETHASONE SODIUM PHOSPHATE 100 MG/10ML
INJECTION INTRAMUSCULAR; INTRAVENOUS CODE/TRAUMA/SEDATION MEDICATION
Status: COMPLETED | OUTPATIENT
Start: 2021-03-08 | End: 2021-03-08

## 2021-03-08 RX ORDER — LIDOCAINE HYDROCHLORIDE 10 MG/ML
1 INJECTION, SOLUTION EPIDURAL; INFILTRATION; INTRACAUDAL; PERINEURAL ONCE AS NEEDED
Status: DISCONTINUED | OUTPATIENT
Start: 2021-03-08 | End: 2021-03-09 | Stop reason: HOSPADM

## 2021-03-08 RX ORDER — MIDAZOLAM HYDROCHLORIDE 1 MG/ML
1 INJECTION INTRAMUSCULAR; INTRAVENOUS
Status: DISCONTINUED | OUTPATIENT
Start: 2021-03-08 | End: 2021-03-09 | Stop reason: HOSPADM

## 2021-03-08 RX ORDER — HEPARIN SODIUM 200 [USP'U]/100ML
INJECTION, SOLUTION INTRAVENOUS
Status: COMPLETED | OUTPATIENT
Start: 2021-03-08 | End: 2021-03-08

## 2021-03-08 RX ORDER — MIDAZOLAM HYDROCHLORIDE 1 MG/ML
INJECTION INTRAMUSCULAR; INTRAVENOUS CODE/TRAUMA/SEDATION MEDICATION
Status: COMPLETED | OUTPATIENT
Start: 2021-03-08 | End: 2021-03-08

## 2021-03-08 RX ADMIN — FENTANYL CITRATE 25 MCG: 50 INJECTION, SOLUTION INTRAMUSCULAR; INTRAVENOUS at 02:03

## 2021-03-08 RX ADMIN — IOHEXOL 60 ML: 300 INJECTION, SOLUTION INTRAVENOUS at 03:03

## 2021-03-08 RX ADMIN — MIDAZOLAM HYDROCHLORIDE 1 MG: 1 INJECTION, SOLUTION INTRAMUSCULAR; INTRAVENOUS at 01:03

## 2021-03-08 RX ADMIN — DEXAMETHASONE SODIUM PHOSPHATE 20 MG: 10 INJECTION INTRAMUSCULAR; INTRAVENOUS at 02:03

## 2021-03-08 RX ADMIN — FENTANYL CITRATE 25 MCG: 50 INJECTION, SOLUTION INTRAMUSCULAR; INTRAVENOUS at 03:03

## 2021-03-08 RX ADMIN — MIDAZOLAM HYDROCHLORIDE 0.5 MG: 1 INJECTION, SOLUTION INTRAMUSCULAR; INTRAVENOUS at 02:03

## 2021-03-08 RX ADMIN — LIDOCAINE HYDROCHLORIDE 7 ML: 10 INJECTION, SOLUTION INFILTRATION; PERINEURAL at 01:03

## 2021-03-08 RX ADMIN — HEPARIN SODIUM IN SODIUM CHLORIDE 2000 UNITS/HR: 200 INJECTION INTRAVENOUS at 02:03

## 2021-03-08 RX ADMIN — MIDAZOLAM HYDROCHLORIDE 0.5 MG: 1 INJECTION, SOLUTION INTRAMUSCULAR; INTRAVENOUS at 03:03

## 2021-03-08 RX ADMIN — FENTANYL CITRATE 50 MCG: 50 INJECTION, SOLUTION INTRAMUSCULAR; INTRAVENOUS at 01:03

## 2021-03-15 DIAGNOSIS — C22.0 HCC (HEPATOCELLULAR CARCINOMA): Primary | ICD-10-CM

## 2021-03-17 ENCOUNTER — TELEPHONE (OUTPATIENT)
Dept: INTERVENTIONAL RADIOLOGY/VASCULAR | Facility: HOSPITAL | Age: 81
End: 2021-03-17

## 2021-03-22 ENCOUNTER — LAB VISIT (OUTPATIENT)
Dept: LAB | Facility: HOSPITAL | Age: 81
End: 2021-03-22
Attending: FAMILY MEDICINE
Payer: OTHER GOVERNMENT

## 2021-03-22 DIAGNOSIS — C22.0 HCC (HEPATOCELLULAR CARCINOMA): ICD-10-CM

## 2021-03-22 LAB
AFP SERPL-MCNC: 73 NG/ML (ref 0–8.4)
ALBUMIN SERPL BCP-MCNC: 3.6 G/DL (ref 3.5–5.2)
ALP SERPL-CCNC: 123 U/L (ref 55–135)
ALT SERPL W/O P-5'-P-CCNC: 25 U/L (ref 10–44)
ANION GAP SERPL CALC-SCNC: 9 MMOL/L (ref 8–16)
AST SERPL-CCNC: 42 U/L (ref 10–40)
BILIRUB DIRECT SERPL-MCNC: 0.8 MG/DL (ref 0.1–0.3)
BILIRUB SERPL-MCNC: 1.7 MG/DL (ref 0.1–1)
BUN SERPL-MCNC: 15 MG/DL (ref 8–23)
CALCIUM SERPL-MCNC: 9.1 MG/DL (ref 8.7–10.5)
CHLORIDE SERPL-SCNC: 102 MMOL/L (ref 95–110)
CO2 SERPL-SCNC: 30 MMOL/L (ref 23–29)
CREAT SERPL-MCNC: 1 MG/DL (ref 0.5–1.4)
EST. GFR  (AFRICAN AMERICAN): >60 ML/MIN/1.73 M^2
EST. GFR  (NON AFRICAN AMERICAN): >60 ML/MIN/1.73 M^2
GLUCOSE SERPL-MCNC: 140 MG/DL (ref 70–110)
POTASSIUM SERPL-SCNC: 4.8 MMOL/L (ref 3.5–5.1)
PROT SERPL-MCNC: 6.9 G/DL (ref 6–8.4)
SODIUM SERPL-SCNC: 141 MMOL/L (ref 136–145)

## 2021-03-22 PROCEDURE — 82248 BILIRUBIN DIRECT: CPT | Performed by: FAMILY MEDICINE

## 2021-03-22 PROCEDURE — 80053 COMPREHEN METABOLIC PANEL: CPT | Performed by: FAMILY MEDICINE

## 2021-03-22 PROCEDURE — 82105 ALPHA-FETOPROTEIN SERUM: CPT | Performed by: FAMILY MEDICINE

## 2021-03-22 PROCEDURE — 36415 COLL VENOUS BLD VENIPUNCTURE: CPT | Performed by: FAMILY MEDICINE

## 2021-03-24 ENCOUNTER — CLINICAL SUPPORT (OUTPATIENT)
Dept: INTERVENTIONAL RADIOLOGY/VASCULAR | Facility: CLINIC | Age: 81
End: 2021-03-24
Payer: OTHER GOVERNMENT

## 2021-03-24 DIAGNOSIS — K76.9 LIVER DISEASE, UNSPECIFIED: ICD-10-CM

## 2021-03-24 DIAGNOSIS — C22.0 HCC (HEPATOCELLULAR CARCINOMA): Primary | ICD-10-CM

## 2021-03-24 PROCEDURE — 99213 OFFICE O/P EST LOW 20 MIN: CPT | Mod: 95,,, | Performed by: PHYSICIAN ASSISTANT

## 2021-03-24 PROCEDURE — 99213 PR OFFICE/OUTPT VISIT, EST, LEVL III, 20-29 MIN: ICD-10-PCS | Mod: 95,,, | Performed by: PHYSICIAN ASSISTANT

## 2021-04-22 ENCOUNTER — HOSPITAL ENCOUNTER (OUTPATIENT)
Dept: RADIOLOGY | Facility: HOSPITAL | Age: 81
Discharge: HOME | End: 2021-04-22
Attending: PHYSICIAN ASSISTANT
Payer: OTHER GOVERNMENT

## 2021-04-22 DIAGNOSIS — K76.9 LIVER DISEASE, UNSPECIFIED: ICD-10-CM

## 2021-04-22 PROCEDURE — 74183 MRI ABD W/O CNTR FLWD CNTR: CPT | Mod: 26,,, | Performed by: RADIOLOGY

## 2021-04-22 PROCEDURE — 74183 MRI ABDOMEN W WO CONTRAST: ICD-10-PCS | Mod: 26,,, | Performed by: RADIOLOGY

## 2021-04-22 PROCEDURE — 25500020 PHARM REV CODE 255: Performed by: PHYSICIAN ASSISTANT

## 2021-04-22 PROCEDURE — A9585 GADOBUTROL INJECTION: HCPCS | Performed by: PHYSICIAN ASSISTANT

## 2021-04-22 PROCEDURE — 74183 MRI ABD W/O CNTR FLWD CNTR: CPT | Mod: TC

## 2021-04-22 RX ORDER — GADOBUTROL 604.72 MG/ML
10 INJECTION INTRAVENOUS
Status: COMPLETED | OUTPATIENT
Start: 2021-04-22 | End: 2021-04-22

## 2021-04-22 RX ADMIN — GADOBUTROL 10 ML: 604.72 INJECTION INTRAVENOUS at 11:04

## 2021-04-27 ENCOUNTER — CLINICAL SUPPORT (OUTPATIENT)
Dept: INTERVENTIONAL RADIOLOGY/VASCULAR | Facility: CLINIC | Age: 81
End: 2021-04-27
Payer: OTHER GOVERNMENT

## 2021-04-27 DIAGNOSIS — C22.0 HCC (HEPATOCELLULAR CARCINOMA): Primary | ICD-10-CM

## 2021-04-27 PROCEDURE — 99213 OFFICE O/P EST LOW 20 MIN: CPT | Mod: 95,,, | Performed by: FAMILY MEDICINE

## 2021-04-27 PROCEDURE — 99213 PR OFFICE/OUTPT VISIT, EST, LEVL III, 20-29 MIN: ICD-10-PCS | Mod: 95,,, | Performed by: FAMILY MEDICINE

## 2021-04-28 ENCOUNTER — TELEPHONE (OUTPATIENT)
Dept: INTERVENTIONAL RADIOLOGY/VASCULAR | Facility: HOSPITAL | Age: 81
End: 2021-04-28

## 2021-04-28 DIAGNOSIS — C22.0 HCC (HEPATOCELLULAR CARCINOMA): Primary | ICD-10-CM

## 2021-04-29 ENCOUNTER — PATIENT MESSAGE (OUTPATIENT)
Dept: HEMATOLOGY/ONCOLOGY | Facility: CLINIC | Age: 81
End: 2021-04-29

## 2021-07-30 ENCOUNTER — TELEPHONE (OUTPATIENT)
Dept: HEMATOLOGY/ONCOLOGY | Facility: CLINIC | Age: 81
End: 2021-07-30

## 2021-08-01 ENCOUNTER — PATIENT MESSAGE (OUTPATIENT)
Dept: HEMATOLOGY/ONCOLOGY | Facility: CLINIC | Age: 81
End: 2021-08-01

## 2021-08-10 ENCOUNTER — PATIENT MESSAGE (OUTPATIENT)
Dept: HEMATOLOGY/ONCOLOGY | Facility: CLINIC | Age: 81
End: 2021-08-10

## 2021-08-23 ENCOUNTER — PATIENT MESSAGE (OUTPATIENT)
Dept: HEMATOLOGY/ONCOLOGY | Facility: CLINIC | Age: 81
End: 2021-08-23

## 2021-08-27 ENCOUNTER — HOSPITAL ENCOUNTER (OUTPATIENT)
Dept: RADIOLOGY | Facility: HOSPITAL | Age: 81
Discharge: HOME | End: 2021-08-27
Attending: INTERNAL MEDICINE
Payer: OTHER GOVERNMENT

## 2021-08-27 ENCOUNTER — PATIENT MESSAGE (OUTPATIENT)
Dept: HEMATOLOGY/ONCOLOGY | Facility: CLINIC | Age: 81
End: 2021-08-27

## 2021-08-27 DIAGNOSIS — C22.0 HCC (HEPATOCELLULAR CARCINOMA): ICD-10-CM

## 2021-08-27 LAB
CREAT SERPL-MCNC: 1.1 MG/DL (ref 0.5–1.4)
SAMPLE: NORMAL

## 2021-08-27 PROCEDURE — 25500020 PHARM REV CODE 255: Performed by: INTERNAL MEDICINE

## 2021-08-27 PROCEDURE — 74183 MRI ABD W/O CNTR FLWD CNTR: CPT | Mod: TC

## 2021-08-27 PROCEDURE — 74183 MRI ABD W/O CNTR FLWD CNTR: CPT | Mod: 26,,, | Performed by: RADIOLOGY

## 2021-08-27 PROCEDURE — A9585 GADOBUTROL INJECTION: HCPCS | Performed by: INTERNAL MEDICINE

## 2021-08-27 PROCEDURE — 71250 CT THORAX DX C-: CPT | Mod: TC

## 2021-08-27 PROCEDURE — 71250 CT CHEST WITHOUT CONTRAST: ICD-10-PCS | Mod: 26,,, | Performed by: RADIOLOGY

## 2021-08-27 PROCEDURE — 71250 CT THORAX DX C-: CPT | Mod: 26,,, | Performed by: RADIOLOGY

## 2021-08-27 PROCEDURE — 74183 MRI ABDOMEN W WO CONTRAST: ICD-10-PCS | Mod: 26,,, | Performed by: RADIOLOGY

## 2021-08-27 RX ORDER — GADOBUTROL 604.72 MG/ML
10 INJECTION INTRAVENOUS
Status: COMPLETED | OUTPATIENT
Start: 2021-08-27 | End: 2021-08-27

## 2021-08-27 RX ADMIN — GADOBUTROL 10 ML: 604.72 INJECTION INTRAVENOUS at 10:08

## 2021-09-14 ENCOUNTER — PATIENT MESSAGE (OUTPATIENT)
Dept: HEMATOLOGY/ONCOLOGY | Facility: CLINIC | Age: 81
End: 2021-09-14

## 2021-09-15 ENCOUNTER — PATIENT MESSAGE (OUTPATIENT)
Dept: HEMATOLOGY/ONCOLOGY | Facility: CLINIC | Age: 81
End: 2021-09-15

## 2021-09-17 ENCOUNTER — PATIENT MESSAGE (OUTPATIENT)
Dept: HEMATOLOGY/ONCOLOGY | Facility: CLINIC | Age: 81
End: 2021-09-17

## 2021-09-17 ENCOUNTER — OFFICE VISIT (OUTPATIENT)
Dept: HEMATOLOGY/ONCOLOGY | Facility: CLINIC | Age: 81
End: 2021-09-17
Payer: OTHER GOVERNMENT

## 2021-09-17 DIAGNOSIS — E80.6 HYPERBILIRUBINEMIA: ICD-10-CM

## 2021-09-17 DIAGNOSIS — D84.81 IMMUNODEFICIENCY SECONDARY TO NEOPLASM: ICD-10-CM

## 2021-09-17 DIAGNOSIS — C22.0 HCC (HEPATOCELLULAR CARCINOMA): Primary | ICD-10-CM

## 2021-09-17 DIAGNOSIS — D49.9 IMMUNODEFICIENCY SECONDARY TO NEOPLASM: ICD-10-CM

## 2021-09-17 DIAGNOSIS — Z01.812 PRE-PROCEDURE LAB EXAM: ICD-10-CM

## 2021-09-17 DIAGNOSIS — K70.30 ALCOHOLIC CIRRHOSIS OF LIVER WITHOUT ASCITES: ICD-10-CM

## 2021-09-17 DIAGNOSIS — J44.9 CHRONIC OBSTRUCTIVE PULMONARY DISEASE, UNSPECIFIED COPD TYPE: ICD-10-CM

## 2021-09-17 PROCEDURE — 99215 PR OFFICE/OUTPT VISIT, EST, LEVL V, 40-54 MIN: ICD-10-PCS | Mod: 95,,, | Performed by: INTERNAL MEDICINE

## 2021-09-17 PROCEDURE — 99215 OFFICE O/P EST HI 40 MIN: CPT | Mod: 95,,, | Performed by: INTERNAL MEDICINE

## 2021-09-20 ENCOUNTER — LAB VISIT (OUTPATIENT)
Dept: LAB | Facility: HOSPITAL | Age: 81
End: 2021-09-20
Attending: INTERNAL MEDICINE
Payer: OTHER GOVERNMENT

## 2021-09-20 DIAGNOSIS — C22.0 HCC (HEPATOCELLULAR CARCINOMA): ICD-10-CM

## 2021-09-20 LAB
AFP SERPL-MCNC: 205 NG/ML (ref 0–8.4)
ALBUMIN SERPL BCP-MCNC: 3.6 G/DL (ref 3.5–5.2)
ALP SERPL-CCNC: 145 U/L (ref 55–135)
ALT SERPL W/O P-5'-P-CCNC: 32 U/L (ref 10–44)
ANION GAP SERPL CALC-SCNC: 11 MMOL/L (ref 8–16)
AST SERPL-CCNC: 53 U/L (ref 10–40)
BASOPHILS # BLD AUTO: 0.03 K/UL (ref 0–0.2)
BASOPHILS NFR BLD: 0.3 % (ref 0–1.9)
BILIRUB SERPL-MCNC: 1.5 MG/DL (ref 0.1–1)
BUN SERPL-MCNC: 20 MG/DL (ref 8–23)
CALCIUM SERPL-MCNC: 9.8 MG/DL (ref 8.7–10.5)
CHLORIDE SERPL-SCNC: 101 MMOL/L (ref 95–110)
CO2 SERPL-SCNC: 29 MMOL/L (ref 23–29)
CREAT SERPL-MCNC: 1.2 MG/DL (ref 0.5–1.4)
DIFFERENTIAL METHOD: ABNORMAL
EOSINOPHIL # BLD AUTO: 0.1 K/UL (ref 0–0.5)
EOSINOPHIL NFR BLD: 0.8 % (ref 0–8)
ERYTHROCYTE [DISTWIDTH] IN BLOOD BY AUTOMATED COUNT: 13.9 % (ref 11.5–14.5)
EST. GFR  (AFRICAN AMERICAN): >60 ML/MIN/1.73 M^2
EST. GFR  (NON AFRICAN AMERICAN): 56.4 ML/MIN/1.73 M^2
GLUCOSE SERPL-MCNC: 66 MG/DL (ref 70–110)
HCT VFR BLD AUTO: 43.7 % (ref 40–54)
HGB BLD-MCNC: 14.6 G/DL (ref 14–18)
IMM GRANULOCYTES # BLD AUTO: 0.03 K/UL (ref 0–0.04)
IMM GRANULOCYTES NFR BLD AUTO: 0.3 % (ref 0–0.5)
INR PPP: 1.1 (ref 0.8–1.2)
LYMPHOCYTES # BLD AUTO: 0.7 K/UL (ref 1–4.8)
LYMPHOCYTES NFR BLD: 6.3 % (ref 18–48)
MCH RBC QN AUTO: 34.4 PG (ref 27–31)
MCHC RBC AUTO-ENTMCNC: 33.4 G/DL (ref 32–36)
MCV RBC AUTO: 103 FL (ref 82–98)
MONOCYTES # BLD AUTO: 1.2 K/UL (ref 0.3–1)
MONOCYTES NFR BLD: 10.8 % (ref 4–15)
NEUTROPHILS # BLD AUTO: 8.9 K/UL (ref 1.8–7.7)
NEUTROPHILS NFR BLD: 81.5 % (ref 38–73)
NRBC BLD-RTO: 0 /100 WBC
PLATELET # BLD AUTO: 150 K/UL (ref 150–450)
PMV BLD AUTO: 9.2 FL (ref 9.2–12.9)
POTASSIUM SERPL-SCNC: 4.2 MMOL/L (ref 3.5–5.1)
PROT SERPL-MCNC: 7.3 G/DL (ref 6–8.4)
PROTHROMBIN TIME: 11.7 SEC (ref 9–12.5)
RBC # BLD AUTO: 4.25 M/UL (ref 4.6–6.2)
SODIUM SERPL-SCNC: 141 MMOL/L (ref 136–145)
WBC # BLD AUTO: 10.87 K/UL (ref 3.9–12.7)

## 2021-09-20 PROCEDURE — 80053 COMPREHEN METABOLIC PANEL: CPT | Performed by: INTERNAL MEDICINE

## 2021-09-20 PROCEDURE — 82105 ALPHA-FETOPROTEIN SERUM: CPT | Performed by: INTERNAL MEDICINE

## 2021-09-20 PROCEDURE — 36415 COLL VENOUS BLD VENIPUNCTURE: CPT | Performed by: INTERNAL MEDICINE

## 2021-09-20 PROCEDURE — 85025 COMPLETE CBC W/AUTO DIFF WBC: CPT | Performed by: INTERNAL MEDICINE

## 2021-09-20 PROCEDURE — 85610 PROTHROMBIN TIME: CPT | Performed by: INTERNAL MEDICINE

## 2021-09-23 ENCOUNTER — PATIENT MESSAGE (OUTPATIENT)
Dept: HEMATOLOGY/ONCOLOGY | Facility: CLINIC | Age: 81
End: 2021-09-23

## 2021-10-18 ENCOUNTER — TELEPHONE (OUTPATIENT)
Dept: INTERVENTIONAL RADIOLOGY/VASCULAR | Facility: HOSPITAL | Age: 81
End: 2021-10-18

## 2021-10-19 ENCOUNTER — HOSPITAL ENCOUNTER (OUTPATIENT)
Dept: RADIOLOGY | Facility: HOSPITAL | Age: 81
Discharge: HOME | End: 2021-10-19
Attending: FAMILY MEDICINE
Payer: OTHER GOVERNMENT

## 2021-10-19 ENCOUNTER — HOSPITAL ENCOUNTER (OUTPATIENT)
Dept: INTERVENTIONAL RADIOLOGY/VASCULAR | Facility: HOSPITAL | Age: 81
Discharge: HOME | End: 2021-10-19
Attending: FAMILY MEDICINE
Payer: OTHER GOVERNMENT

## 2021-10-19 DIAGNOSIS — C22.0 HCC (HEPATOCELLULAR CARCINOMA): ICD-10-CM

## 2021-10-19 LAB
ALBUMIN SERPL BCP-MCNC: 3.5 G/DL (ref 3.5–5.2)
ALP SERPL-CCNC: 137 U/L (ref 55–135)
ALT SERPL W/O P-5'-P-CCNC: 31 U/L (ref 10–44)
ANION GAP SERPL CALC-SCNC: 10 MMOL/L (ref 8–16)
AST SERPL-CCNC: 47 U/L (ref 10–40)
BASOPHILS # BLD AUTO: 0.04 K/UL (ref 0–0.2)
BASOPHILS NFR BLD: 0.5 % (ref 0–1.9)
BILIRUB DIRECT SERPL-MCNC: 0.9 MG/DL (ref 0.1–0.3)
BILIRUB SERPL-MCNC: 1.9 MG/DL (ref 0.1–1)
BUN SERPL-MCNC: 19 MG/DL (ref 8–23)
CALCIUM SERPL-MCNC: 9.7 MG/DL (ref 8.7–10.5)
CHLORIDE SERPL-SCNC: 103 MMOL/L (ref 95–110)
CO2 SERPL-SCNC: 24 MMOL/L (ref 23–29)
CREAT SERPL-MCNC: 1.1 MG/DL (ref 0.5–1.4)
DIFFERENTIAL METHOD: ABNORMAL
EOSINOPHIL # BLD AUTO: 0.1 K/UL (ref 0–0.5)
EOSINOPHIL NFR BLD: 1.6 % (ref 0–8)
ERYTHROCYTE [DISTWIDTH] IN BLOOD BY AUTOMATED COUNT: 14.4 % (ref 11.5–14.5)
EST. GFR  (AFRICAN AMERICAN): >60 ML/MIN/1.73 M^2
EST. GFR  (NON AFRICAN AMERICAN): >60 ML/MIN/1.73 M^2
GLUCOSE SERPL-MCNC: 111 MG/DL (ref 70–110)
HCT VFR BLD AUTO: 43.2 % (ref 40–54)
HGB BLD-MCNC: 14.9 G/DL (ref 14–18)
IMM GRANULOCYTES # BLD AUTO: 0.03 K/UL (ref 0–0.04)
IMM GRANULOCYTES NFR BLD AUTO: 0.3 % (ref 0–0.5)
INR PPP: 1.1 (ref 0.8–1.2)
LYMPHOCYTES # BLD AUTO: 0.7 K/UL (ref 1–4.8)
LYMPHOCYTES NFR BLD: 8 % (ref 18–48)
MCH RBC QN AUTO: 34 PG (ref 27–31)
MCHC RBC AUTO-ENTMCNC: 34.5 G/DL (ref 32–36)
MCV RBC AUTO: 99 FL (ref 82–98)
MONOCYTES # BLD AUTO: 0.8 K/UL (ref 0.3–1)
MONOCYTES NFR BLD: 9.5 % (ref 4–15)
NEUTROPHILS # BLD AUTO: 7.1 K/UL (ref 1.8–7.7)
NEUTROPHILS NFR BLD: 80.1 % (ref 38–73)
NRBC BLD-RTO: 0 /100 WBC
PLATELET # BLD AUTO: 133 K/UL (ref 150–450)
PMV BLD AUTO: 9.4 FL (ref 9.2–12.9)
POTASSIUM SERPL-SCNC: 3.9 MMOL/L (ref 3.5–5.1)
PROT SERPL-MCNC: 6.9 G/DL (ref 6–8.4)
PROTHROMBIN TIME: 11.8 SEC (ref 9–12.5)
RBC # BLD AUTO: 4.38 M/UL (ref 4.6–6.2)
SODIUM SERPL-SCNC: 137 MMOL/L (ref 136–145)
WBC # BLD AUTO: 8.85 K/UL (ref 3.9–12.7)

## 2021-10-19 PROCEDURE — 36245 INS CATH ABD/L-EXT ART 1ST: CPT | Mod: 59,,, | Performed by: RADIOLOGY

## 2021-10-19 PROCEDURE — 78830 RP LOCLZJ TUM SPECT W/CT 1: CPT | Mod: TC

## 2021-10-19 PROCEDURE — 85610 PROTHROMBIN TIME: CPT | Performed by: FAMILY MEDICINE

## 2021-10-19 PROCEDURE — 76937 US GUIDE VASCULAR ACCESS: CPT | Mod: TC | Performed by: RADIOLOGY

## 2021-10-19 PROCEDURE — 99152 MOD SED SAME PHYS/QHP 5/>YRS: CPT | Performed by: RADIOLOGY

## 2021-10-19 PROCEDURE — 75774 ARTERY X-RAY EACH VESSEL: CPT | Mod: 26,,, | Performed by: RADIOLOGY

## 2021-10-19 PROCEDURE — 36248 INS CATH ABD/L-EXT ART ADDL: CPT | Mod: ,,, | Performed by: RADIOLOGY

## 2021-10-19 PROCEDURE — 78830 RP LOCLZJ TUM SPECT W/CT 1: CPT | Mod: 26,,, | Performed by: RADIOLOGY

## 2021-10-19 PROCEDURE — 25500020 PHARM REV CODE 255: Performed by: FAMILY MEDICINE

## 2021-10-19 PROCEDURE — 75726 ARTERY X-RAYS ABDOMEN: CPT | Mod: TC | Performed by: RADIOLOGY

## 2021-10-19 PROCEDURE — 76380 CAT SCAN FOLLOW-UP STUDY: CPT | Mod: 26,,, | Performed by: RADIOLOGY

## 2021-10-19 PROCEDURE — 75774 PR  ANGIO EA ADDNL SELECTV VESSEL: ICD-10-PCS | Mod: 26,,, | Performed by: RADIOLOGY

## 2021-10-19 PROCEDURE — 36247 INS CATH ABD/L-EXT ART 3RD: CPT | Performed by: RADIOLOGY

## 2021-10-19 PROCEDURE — 75726 ARTERY X-RAYS ABDOMEN: CPT | Mod: 26,,, | Performed by: RADIOLOGY

## 2021-10-19 PROCEDURE — G0269 OCCLUSIVE DEVICE IN VEIN ART: HCPCS | Performed by: RADIOLOGY

## 2021-10-19 PROCEDURE — 63600175 PHARM REV CODE 636 W HCPCS: Performed by: RADIOLOGY

## 2021-10-19 PROCEDURE — 76937 PR  US GUIDE, VASCULAR ACCESS: ICD-10-PCS | Mod: 26,,, | Performed by: RADIOLOGY

## 2021-10-19 PROCEDURE — 76377 3D RENDER W/INTRP POSTPROCES: CPT | Mod: 26,59,, | Performed by: RADIOLOGY

## 2021-10-19 PROCEDURE — 85025 COMPLETE CBC W/AUTO DIFF WBC: CPT | Performed by: FAMILY MEDICINE

## 2021-10-19 PROCEDURE — 36245 INS CATH ABD/L-EXT ART 1ST: CPT | Mod: 59 | Performed by: RADIOLOGY

## 2021-10-19 PROCEDURE — C1894 INTRO/SHEATH, NON-LASER: HCPCS

## 2021-10-19 PROCEDURE — 76377 PR  3D RENDERING W/ IMAGE POSTPROCESS: ICD-10-PCS | Mod: 26,59,, | Performed by: RADIOLOGY

## 2021-10-19 PROCEDURE — 99153 MOD SED SAME PHYS/QHP EA: CPT | Performed by: RADIOLOGY

## 2021-10-19 PROCEDURE — 82248 BILIRUBIN DIRECT: CPT | Performed by: FAMILY MEDICINE

## 2021-10-19 PROCEDURE — 78201 LIVER IMAGING STATIC ONLY: CPT | Mod: 26,59,, | Performed by: RADIOLOGY

## 2021-10-19 PROCEDURE — 36248 INS CATH ABD/L-EXT ART ADDL: CPT | Performed by: RADIOLOGY

## 2021-10-19 PROCEDURE — 77290 PR  SET RADN THERAPY FIELD COMPLEX: ICD-10-PCS | Mod: 26,,, | Performed by: RADIOLOGY

## 2021-10-19 PROCEDURE — 78830 NM SPECT/CT SINGLE AREA: ICD-10-PCS | Mod: 26,,, | Performed by: RADIOLOGY

## 2021-10-19 PROCEDURE — 76380 CAT SCAN FOLLOW-UP STUDY: CPT | Mod: TC | Performed by: RADIOLOGY

## 2021-10-19 PROCEDURE — 36247 IR EMBOLIZATION COMP FOR TUMOR_ORGAN ISCHEMIA_INFARC: ICD-10-PCS | Mod: 51,,, | Performed by: RADIOLOGY

## 2021-10-19 PROCEDURE — 77290 THER RAD SIMULAJ FIELD CPLX: CPT | Mod: 26,,, | Performed by: RADIOLOGY

## 2021-10-19 PROCEDURE — 63600175 PHARM REV CODE 636 W HCPCS: Performed by: PHYSICIAN ASSISTANT

## 2021-10-19 PROCEDURE — 36248 PR PR INS CATH ABD/L-EXT ART ADDL 2ND ORD/3RD ORD/BYD: ICD-10-PCS | Mod: ,,, | Performed by: RADIOLOGY

## 2021-10-19 PROCEDURE — 25000003 PHARM REV CODE 250: Performed by: RADIOLOGY

## 2021-10-19 PROCEDURE — 76380 PR  CT SCAN,LIMITED/LOCALIZED F/U STUDY: ICD-10-PCS | Mod: 26,,, | Performed by: RADIOLOGY

## 2021-10-19 PROCEDURE — 78201 NM LIVER IMAGING STATIC PRE Y-90 EMBOLIZATION: ICD-10-PCS | Mod: 26,59,, | Performed by: RADIOLOGY

## 2021-10-19 PROCEDURE — 76377 3D RENDER W/INTRP POSTPROCES: CPT | Mod: TC | Performed by: RADIOLOGY

## 2021-10-19 PROCEDURE — 80053 COMPREHEN METABOLIC PANEL: CPT | Performed by: FAMILY MEDICINE

## 2021-10-19 PROCEDURE — 78201 LIVER IMAGING STATIC ONLY: CPT | Mod: TC

## 2021-10-19 PROCEDURE — 75887 PR  PERCUT XHEPATIC PORTOGRAM: ICD-10-PCS | Mod: 26,,, | Performed by: RADIOLOGY

## 2021-10-19 PROCEDURE — 76937 US GUIDE VASCULAR ACCESS: CPT | Mod: 26,,, | Performed by: RADIOLOGY

## 2021-10-19 PROCEDURE — 75774 ARTERY X-RAY EACH VESSEL: CPT | Mod: TC | Performed by: RADIOLOGY

## 2021-10-19 PROCEDURE — 75726 CHG ANGIO VISCERAL SELECTV/SUBSELEC: ICD-10-PCS | Mod: 26,,, | Performed by: RADIOLOGY

## 2021-10-19 PROCEDURE — 75887 VEIN X-RAY LIVER W/O HEMODYN: CPT | Mod: 26,,, | Performed by: RADIOLOGY

## 2021-10-19 PROCEDURE — 77290 THER RAD SIMULAJ FIELD CPLX: CPT | Mod: TC | Performed by: RADIOLOGY

## 2021-10-19 PROCEDURE — 36245 PR INS CATH ABD/L-EXT ART 1ST ORDER: ICD-10-PCS | Mod: 59,,, | Performed by: RADIOLOGY

## 2021-10-19 RX ORDER — FENTANYL CITRATE 50 UG/ML
50 INJECTION, SOLUTION INTRAMUSCULAR; INTRAVENOUS
Status: DISCONTINUED | OUTPATIENT
Start: 2021-10-19 | End: 2021-10-20 | Stop reason: HOSPADM

## 2021-10-19 RX ORDER — MIDAZOLAM HYDROCHLORIDE 1 MG/ML
1 INJECTION INTRAMUSCULAR; INTRAVENOUS
Status: DISCONTINUED | OUTPATIENT
Start: 2021-10-19 | End: 2021-10-20 | Stop reason: HOSPADM

## 2021-10-19 RX ORDER — LIDOCAINE HYDROCHLORIDE 10 MG/ML
1 INJECTION, SOLUTION EPIDURAL; INFILTRATION; INTRACAUDAL; PERINEURAL ONCE AS NEEDED
Status: DISCONTINUED | OUTPATIENT
Start: 2021-10-19 | End: 2021-10-20 | Stop reason: HOSPADM

## 2021-10-19 RX ORDER — MIDAZOLAM HYDROCHLORIDE 1 MG/ML
INJECTION INTRAMUSCULAR; INTRAVENOUS CODE/TRAUMA/SEDATION MEDICATION
Status: COMPLETED | OUTPATIENT
Start: 2021-10-19 | End: 2021-10-19

## 2021-10-19 RX ORDER — FENTANYL CITRATE 50 UG/ML
INJECTION, SOLUTION INTRAMUSCULAR; INTRAVENOUS CODE/TRAUMA/SEDATION MEDICATION
Status: COMPLETED | OUTPATIENT
Start: 2021-10-19 | End: 2021-10-19

## 2021-10-19 RX ORDER — SODIUM CHLORIDE 9 MG/ML
INJECTION, SOLUTION INTRAVENOUS CONTINUOUS
Status: DISCONTINUED | OUTPATIENT
Start: 2021-10-19 | End: 2021-10-20 | Stop reason: HOSPADM

## 2021-10-19 RX ORDER — LIDOCAINE HYDROCHLORIDE 10 MG/ML
INJECTION INFILTRATION; PERINEURAL CODE/TRAUMA/SEDATION MEDICATION
Status: COMPLETED | OUTPATIENT
Start: 2021-10-19 | End: 2021-10-19

## 2021-10-19 RX ADMIN — FENTANYL CITRATE 25 MCG: 50 INJECTION, SOLUTION INTRAMUSCULAR; INTRAVENOUS at 10:10

## 2021-10-19 RX ADMIN — FENTANYL CITRATE 25 MCG: 50 INJECTION, SOLUTION INTRAMUSCULAR; INTRAVENOUS at 09:10

## 2021-10-19 RX ADMIN — MIDAZOLAM HYDROCHLORIDE 0.5 MG: 1 INJECTION INTRAMUSCULAR; INTRAVENOUS at 10:10

## 2021-10-19 RX ADMIN — LIDOCAINE HYDROCHLORIDE 10 ML: 10 INJECTION, SOLUTION INFILTRATION; PERINEURAL at 09:10

## 2021-10-19 RX ADMIN — MIDAZOLAM HYDROCHLORIDE 0.5 MG: 1 INJECTION INTRAMUSCULAR; INTRAVENOUS at 09:10

## 2021-10-19 RX ADMIN — IOHEXOL 160 ML: 300 INJECTION, SOLUTION INTRAVENOUS at 10:10

## 2021-10-21 VITALS
SYSTOLIC BLOOD PRESSURE: 126 MMHG | TEMPERATURE: 99 F | HEIGHT: 72 IN | OXYGEN SATURATION: 95 % | DIASTOLIC BLOOD PRESSURE: 47 MMHG | HEART RATE: 52 BPM | WEIGHT: 230 LBS | BODY MASS INDEX: 31.15 KG/M2 | RESPIRATION RATE: 14 BRPM

## 2021-11-18 DIAGNOSIS — C22.0 HCC (HEPATOCELLULAR CARCINOMA): Primary | ICD-10-CM

## 2021-11-18 RX ORDER — MIDAZOLAM HYDROCHLORIDE 1 MG/ML
1 INJECTION INTRAMUSCULAR; INTRAVENOUS
Status: CANCELLED | OUTPATIENT
Start: 2021-11-18

## 2021-11-18 RX ORDER — LIDOCAINE HYDROCHLORIDE 10 MG/ML
1 INJECTION, SOLUTION EPIDURAL; INFILTRATION; INTRACAUDAL; PERINEURAL ONCE AS NEEDED
Status: CANCELLED | OUTPATIENT
Start: 2021-11-18 | End: 2033-04-16

## 2021-11-18 RX ORDER — SODIUM CHLORIDE 9 MG/ML
INJECTION, SOLUTION INTRAVENOUS CONTINUOUS
Status: CANCELLED | OUTPATIENT
Start: 2021-11-18

## 2021-11-18 RX ORDER — FENTANYL CITRATE 50 UG/ML
50 INJECTION, SOLUTION INTRAMUSCULAR; INTRAVENOUS
Status: CANCELLED | OUTPATIENT
Start: 2021-11-18

## 2021-11-19 RX ORDER — MIDAZOLAM HYDROCHLORIDE 1 MG/ML
1 INJECTION INTRAMUSCULAR; INTRAVENOUS
Status: CANCELLED | OUTPATIENT
Start: 2021-11-19

## 2021-11-19 RX ORDER — HEPARIN SODIUM 200 [USP'U]/100ML
1000 INJECTION, SOLUTION INTRAVENOUS CONTINUOUS
Status: CANCELLED | OUTPATIENT
Start: 2021-11-19

## 2021-11-19 RX ORDER — FENTANYL CITRATE 50 UG/ML
50 INJECTION, SOLUTION INTRAMUSCULAR; INTRAVENOUS
Status: CANCELLED | OUTPATIENT
Start: 2021-11-19

## 2021-11-26 ENCOUNTER — HOSPITAL ENCOUNTER (OUTPATIENT)
Dept: RADIOLOGY | Facility: HOSPITAL | Age: 81
Discharge: HOME | End: 2021-11-26
Attending: FAMILY MEDICINE
Payer: OTHER GOVERNMENT

## 2021-11-26 ENCOUNTER — HOSPITAL ENCOUNTER (OUTPATIENT)
Dept: INTERVENTIONAL RADIOLOGY/VASCULAR | Facility: HOSPITAL | Age: 81
Discharge: HOME | End: 2021-11-26
Attending: FAMILY MEDICINE
Payer: OTHER GOVERNMENT

## 2021-11-26 VITALS
BODY MASS INDEX: 30.48 KG/M2 | OXYGEN SATURATION: 100 % | SYSTOLIC BLOOD PRESSURE: 130 MMHG | RESPIRATION RATE: 14 BRPM | DIASTOLIC BLOOD PRESSURE: 61 MMHG | HEART RATE: 54 BPM | HEIGHT: 72 IN | TEMPERATURE: 98 F | WEIGHT: 225 LBS

## 2021-11-26 DIAGNOSIS — C22.0 HCC (HEPATOCELLULAR CARCINOMA): ICD-10-CM

## 2021-11-26 PROCEDURE — 77300 RADIATION THERAPY DOSE PLAN: CPT | Mod: 26,,, | Performed by: RADIOLOGY

## 2021-11-26 PROCEDURE — 36247 INS CATH ABD/L-EXT ART 3RD: CPT | Mod: 51,LT,, | Performed by: RADIOLOGY

## 2021-11-26 PROCEDURE — 78201 LIVER IMAGING STATIC ONLY: CPT | Mod: 26,59,, | Performed by: RADIOLOGY

## 2021-11-26 PROCEDURE — 36248 PR PR INS CATH ABD/L-EXT ART ADDL 2ND ORD/3RD ORD/BYD: ICD-10-PCS | Mod: 59,,, | Performed by: RADIOLOGY

## 2021-11-26 PROCEDURE — 78830 RP LOCLZJ TUM SPECT W/CT 1: CPT | Mod: TC

## 2021-11-26 PROCEDURE — 76937 PR  US GUIDE, VASCULAR ACCESS: ICD-10-PCS | Mod: 26,,, | Performed by: RADIOLOGY

## 2021-11-26 PROCEDURE — 79445 NUCLEAR RX INTRA-ARTERIAL: CPT | Mod: 26,,, | Performed by: RADIOLOGY

## 2021-11-26 PROCEDURE — 36247 INS CATH ABD/L-EXT ART 3RD: CPT | Mod: LT | Performed by: RADIOLOGY

## 2021-11-26 PROCEDURE — G0269 OCCLUSIVE DEVICE IN VEIN ART: HCPCS | Performed by: RADIOLOGY

## 2021-11-26 PROCEDURE — 76380 PR  CT SCAN,LIMITED/LOCALIZED F/U STUDY: ICD-10-PCS | Mod: 26,59,, | Performed by: RADIOLOGY

## 2021-11-26 PROCEDURE — 78201 NM LIVER IMAGING STATIC POST Y-90 EMBOLIZATION: ICD-10-PCS | Mod: 26,59,, | Performed by: RADIOLOGY

## 2021-11-26 PROCEDURE — 63600175 PHARM REV CODE 636 W HCPCS: Performed by: RADIOLOGY

## 2021-11-26 PROCEDURE — 77300 PR RADIATION THERAPY,DOSIMETRY PLAN: ICD-10-PCS | Mod: 26,,, | Performed by: RADIOLOGY

## 2021-11-26 PROCEDURE — 76380 CAT SCAN FOLLOW-UP STUDY: CPT | Mod: TC,59 | Performed by: RADIOLOGY

## 2021-11-26 PROCEDURE — 75774 ARTERY X-RAY EACH VESSEL: CPT | Mod: 26,59,, | Performed by: RADIOLOGY

## 2021-11-26 PROCEDURE — 75774 ARTERY X-RAY EACH VESSEL: CPT | Mod: TC,59 | Performed by: RADIOLOGY

## 2021-11-26 PROCEDURE — 75726 ARTERY X-RAYS ABDOMEN: CPT | Mod: TC,59 | Performed by: RADIOLOGY

## 2021-11-26 PROCEDURE — 37243 IR EMBOLIZATION COMP FOR TUMOR_ORGAN ISCHEMIA_INFARC: ICD-10-PCS | Mod: ,,, | Performed by: RADIOLOGY

## 2021-11-26 PROCEDURE — 37243 VASC EMBOLIZE/OCCLUDE ORGAN: CPT | Performed by: RADIOLOGY

## 2021-11-26 PROCEDURE — 76377 3D RENDER W/INTRP POSTPROCES: CPT | Mod: TC | Performed by: RADIOLOGY

## 2021-11-26 PROCEDURE — 76937 US GUIDE VASCULAR ACCESS: CPT | Mod: 26,,, | Performed by: RADIOLOGY

## 2021-11-26 PROCEDURE — 99153 MOD SED SAME PHYS/QHP EA: CPT | Performed by: RADIOLOGY

## 2021-11-26 PROCEDURE — 79445 PR  NUCLEAR THERAPY, INTRA-ARTERIAL: ICD-10-PCS | Mod: 26,,, | Performed by: RADIOLOGY

## 2021-11-26 PROCEDURE — 75726 CHG ANGIO VISCERAL SELECTV/SUBSELEC: ICD-10-PCS | Mod: 26,59,, | Performed by: RADIOLOGY

## 2021-11-26 PROCEDURE — 25000003 PHARM REV CODE 250: Performed by: RADIOLOGY

## 2021-11-26 PROCEDURE — 36247 PR PLACE CATH SUBSUBSELECT ART,ABD/PEL: ICD-10-PCS | Mod: 51,LT,, | Performed by: RADIOLOGY

## 2021-11-26 PROCEDURE — 78830 NM SPECT/CT SINGLE AREA: ICD-10-PCS | Mod: 26,,, | Performed by: RADIOLOGY

## 2021-11-26 PROCEDURE — 75726 ARTERY X-RAYS ABDOMEN: CPT | Mod: 26,59,, | Performed by: RADIOLOGY

## 2021-11-26 PROCEDURE — 75774 PR  ANGIO EA ADDNL SELECTV VESSEL: ICD-10-PCS | Mod: 26,59,, | Performed by: RADIOLOGY

## 2021-11-26 PROCEDURE — 77263 THER RADIOLOGY TX PLNG CPLX: CPT | Mod: ,,, | Performed by: RADIOLOGY

## 2021-11-26 PROCEDURE — 25500020 PHARM REV CODE 255: Performed by: RADIOLOGY

## 2021-11-26 PROCEDURE — 79445 NUCLEAR RX INTRA-ARTERIAL: CPT | Mod: TC | Performed by: RADIOLOGY

## 2021-11-26 PROCEDURE — 36248 INS CATH ABD/L-EXT ART ADDL: CPT | Mod: 59,,, | Performed by: RADIOLOGY

## 2021-11-26 PROCEDURE — 36248 INS CATH ABD/L-EXT ART ADDL: CPT | Mod: 59 | Performed by: RADIOLOGY

## 2021-11-26 PROCEDURE — C1894 INTRO/SHEATH, NON-LASER: HCPCS

## 2021-11-26 PROCEDURE — 76937 US GUIDE VASCULAR ACCESS: CPT | Mod: TC | Performed by: RADIOLOGY

## 2021-11-26 PROCEDURE — 77300 RADIATION THERAPY DOSE PLAN: CPT | Mod: TC | Performed by: RADIOLOGY

## 2021-11-26 PROCEDURE — 76377 3D RENDER W/INTRP POSTPROCES: CPT | Mod: 26,,, | Performed by: RADIOLOGY

## 2021-11-26 PROCEDURE — 99152 MOD SED SAME PHYS/QHP 5/>YRS: CPT | Mod: 59 | Performed by: RADIOLOGY

## 2021-11-26 PROCEDURE — 76377 PR  3D RENDERING W/ IMAGE POSTPROCESS: ICD-10-PCS | Mod: 26,,, | Performed by: RADIOLOGY

## 2021-11-26 PROCEDURE — 76380 CAT SCAN FOLLOW-UP STUDY: CPT | Mod: 26,59,, | Performed by: RADIOLOGY

## 2021-11-26 PROCEDURE — 78830 RP LOCLZJ TUM SPECT W/CT 1: CPT | Mod: 26,,, | Performed by: RADIOLOGY

## 2021-11-26 PROCEDURE — 77263 PR  RADIATION THERAPY PLAN COMPLEX: ICD-10-PCS | Mod: ,,, | Performed by: RADIOLOGY

## 2021-11-26 PROCEDURE — 78201 LIVER IMAGING STATIC ONLY: CPT | Mod: TC

## 2021-11-26 RX ORDER — DEXAMETHASONE SODIUM PHOSPHATE 100 MG/10ML
INJECTION INTRAMUSCULAR; INTRAVENOUS CODE/TRAUMA/SEDATION MEDICATION
Status: COMPLETED | OUTPATIENT
Start: 2021-11-26 | End: 2021-11-26

## 2021-11-26 RX ORDER — LIDOCAINE HYDROCHLORIDE 10 MG/ML
1 INJECTION, SOLUTION EPIDURAL; INFILTRATION; INTRACAUDAL; PERINEURAL ONCE AS NEEDED
Status: DISCONTINUED | OUTPATIENT
Start: 2021-11-26 | End: 2021-11-27 | Stop reason: HOSPADM

## 2021-11-26 RX ORDER — SODIUM CHLORIDE 9 MG/ML
INJECTION, SOLUTION INTRAVENOUS CONTINUOUS
Status: DISCONTINUED | OUTPATIENT
Start: 2021-11-26 | End: 2021-11-27 | Stop reason: HOSPADM

## 2021-11-26 RX ORDER — LIDOCAINE HYDROCHLORIDE 10 MG/ML
INJECTION INFILTRATION; PERINEURAL CODE/TRAUMA/SEDATION MEDICATION
Status: COMPLETED | OUTPATIENT
Start: 2021-11-26 | End: 2021-11-26

## 2021-11-26 RX ORDER — MIDAZOLAM HYDROCHLORIDE 1 MG/ML
INJECTION INTRAMUSCULAR; INTRAVENOUS CODE/TRAUMA/SEDATION MEDICATION
Status: COMPLETED | OUTPATIENT
Start: 2021-11-26 | End: 2021-11-26

## 2021-11-26 RX ORDER — FENTANYL CITRATE 50 UG/ML
INJECTION, SOLUTION INTRAMUSCULAR; INTRAVENOUS CODE/TRAUMA/SEDATION MEDICATION
Status: COMPLETED | OUTPATIENT
Start: 2021-11-26 | End: 2021-11-26

## 2021-11-26 RX ADMIN — MIDAZOLAM HYDROCHLORIDE 1 MG: 1 INJECTION INTRAMUSCULAR; INTRAVENOUS at 11:11

## 2021-11-26 RX ADMIN — FENTANYL CITRATE 50 MCG: 50 INJECTION, SOLUTION INTRAMUSCULAR; INTRAVENOUS at 11:11

## 2021-11-26 RX ADMIN — DEXAMETHASONE SODIUM PHOSPHATE 20 MG: 10 INJECTION INTRAMUSCULAR; INTRAVENOUS at 12:11

## 2021-11-26 RX ADMIN — IOHEXOL 70 ML: 300 INJECTION, SOLUTION INTRAVENOUS at 12:11

## 2021-11-26 RX ADMIN — LIDOCAINE HYDROCHLORIDE 5 ML: 10 INJECTION, SOLUTION INFILTRATION; PERINEURAL at 11:11

## 2021-11-29 DIAGNOSIS — C22.0 HCC (HEPATOCELLULAR CARCINOMA): Primary | ICD-10-CM

## 2021-12-12 ENCOUNTER — PATIENT MESSAGE (OUTPATIENT)
Dept: HEMATOLOGY/ONCOLOGY | Facility: CLINIC | Age: 81
End: 2021-12-12
Payer: OTHER GOVERNMENT

## 2021-12-13 DIAGNOSIS — R60.9 EDEMA, UNSPECIFIED TYPE: ICD-10-CM

## 2021-12-13 DIAGNOSIS — C22.0 HCC (HEPATOCELLULAR CARCINOMA): Primary | ICD-10-CM

## 2021-12-29 ENCOUNTER — TELEPHONE (OUTPATIENT)
Dept: INTERVENTIONAL RADIOLOGY/VASCULAR | Facility: CLINIC | Age: 81
End: 2021-12-29
Payer: OTHER GOVERNMENT

## 2022-01-03 ENCOUNTER — PATIENT MESSAGE (OUTPATIENT)
Dept: HEMATOLOGY/ONCOLOGY | Facility: CLINIC | Age: 82
End: 2022-01-03
Payer: OTHER GOVERNMENT

## 2022-01-05 ENCOUNTER — HOSPITAL ENCOUNTER (OUTPATIENT)
Dept: RADIOLOGY | Facility: HOSPITAL | Age: 82
Discharge: HOME | End: 2022-01-05
Attending: INTERNAL MEDICINE
Payer: OTHER GOVERNMENT

## 2022-01-05 DIAGNOSIS — C22.0 HCC (HEPATOCELLULAR CARCINOMA): ICD-10-CM

## 2022-01-05 PROCEDURE — 74183 MRI ABD W/O CNTR FLWD CNTR: CPT | Mod: 26,,, | Performed by: INTERNAL MEDICINE

## 2022-01-05 PROCEDURE — 74183 MRI ABDOMEN W WO CONTRAST: ICD-10-PCS | Mod: 26,,, | Performed by: INTERNAL MEDICINE

## 2022-01-05 PROCEDURE — 71250 CT THORAX DX C-: CPT | Mod: TC

## 2022-01-05 PROCEDURE — 74183 MRI ABD W/O CNTR FLWD CNTR: CPT | Mod: TC

## 2022-01-05 PROCEDURE — 71250 CT CHEST WITHOUT CONTRAST: ICD-10-PCS | Mod: 26,,, | Performed by: RADIOLOGY

## 2022-01-05 PROCEDURE — 71250 CT THORAX DX C-: CPT | Mod: 26,,, | Performed by: RADIOLOGY

## 2022-01-05 PROCEDURE — 25500020 PHARM REV CODE 255: Performed by: INTERNAL MEDICINE

## 2022-01-05 PROCEDURE — A9585 GADOBUTROL INJECTION: HCPCS | Performed by: INTERNAL MEDICINE

## 2022-01-05 RX ORDER — GADOBUTROL 604.72 MG/ML
10 INJECTION INTRAVENOUS
Status: COMPLETED | OUTPATIENT
Start: 2022-01-05 | End: 2022-01-05

## 2022-01-05 RX ADMIN — GADOBUTROL 10 ML: 604.72 INJECTION INTRAVENOUS at 01:01

## 2022-01-06 ENCOUNTER — PATIENT MESSAGE (OUTPATIENT)
Dept: HEMATOLOGY/ONCOLOGY | Facility: CLINIC | Age: 82
End: 2022-01-06

## 2022-01-06 ENCOUNTER — OFFICE VISIT (OUTPATIENT)
Dept: HEMATOLOGY/ONCOLOGY | Facility: CLINIC | Age: 82
End: 2022-01-06
Payer: OTHER GOVERNMENT

## 2022-01-06 DIAGNOSIS — C22.0 HCC (HEPATOCELLULAR CARCINOMA): Primary | ICD-10-CM

## 2022-01-06 DIAGNOSIS — R60.9 EDEMA, UNSPECIFIED TYPE: ICD-10-CM

## 2022-01-06 DIAGNOSIS — R18.8 CIRRHOSIS OF LIVER WITH ASCITES, UNSPECIFIED HEPATIC CIRRHOSIS TYPE: ICD-10-CM

## 2022-01-06 DIAGNOSIS — K74.60 CIRRHOSIS OF LIVER WITH ASCITES, UNSPECIFIED HEPATIC CIRRHOSIS TYPE: ICD-10-CM

## 2022-01-06 PROCEDURE — 99215 OFFICE O/P EST HI 40 MIN: CPT | Mod: 95,,, | Performed by: INTERNAL MEDICINE

## 2022-01-06 PROCEDURE — 99215 PR OFFICE/OUTPT VISIT, EST, LEVL V, 40-54 MIN: ICD-10-PCS | Mod: 95,,, | Performed by: INTERNAL MEDICINE

## 2022-01-06 NOTE — PROGRESS NOTES
"The patient location is: home  The chief complaint leading to consultation is: follow up for HCC    Visit type: audiovisual    Face to Face time with patient: 40 min  60 minutes of total time spent on the encounter, which includes face to face time and non-face to face time preparing to see the patient (eg, review of tests), Obtaining and/or reviewing separately obtained history, Documenting clinical information in the electronic or other health record, Independently interpreting results (not separately reported) and communicating results to the patient/family/caregiver, or Care coordination (not separately reported).         Each patient to whom he or she provides medical services by telemedicine is:  (1) informed of the relationship between the physician and patient and the respective role of any other health care provider with respect to management of the patient; and (2) notified that he or she may decline to receive medical services by telemedicine and may withdraw from such care at any time.    Notes:                                                              PROGRESS NOTE    Subjective:       Patient ID: Molina Lang Jr is a 81 y.o. male.    Chief Complaint: follow up for HCC    Diagnosis:  Multifocal HCC, initially BCLC stage B    Oncologic History:  1. Mr Lang is a 78 yo man with PMH of COPD, hypertension, abdominal aortic aneurysm status post repair and alcoholic cirrhosis of the liver who is referred by Dr Geronimo and initially saw me in Oct 2019 for a second opinion re localized liver cancer. [facility] As [facility] Dr Geronimo's note, [facility].  "Per review of his available outside hospital records it appears that he was initially noted to have a small lesion in the right lobe of the liver on an MRI scan in 2013 and this was subsequently followed up with additional imaging studies over the next 2 years which continue to " demonstrate evidence of a poorly defined mass with heterogenous enhancement with rapid washout in segment 7 of the liver. This led to his referral to medical oncology and following that time he had another CT scan in September 2016 that showed evidence of a 2.2 cm enhancing nodule within the right hepatic lobe highly concerning for hepatocellular carcinoma in the setting of underlying cirrhosis of the liver. It was initially discussed about the possibility of obtaining a liver biopsy at [facility] but after patient's case was discussed at the [facility] tumor board plans for biopsy were canceled as they felt that his imaging findings in the setting of cirrhosis were confirmatory for HCC. Patient then had a microwave ablation done to his segment 7 lesion in [place] in 2016. Since that time patient has been followed on observation for his liver cancer with periodic surveillance imaging studies. He did have an MRI of the liver done in March 2018 which showed a new small focus of subtle arterial enhancement with washout near his previous segment 7 ablation site as well as numerous additional small arterial enhancing foci with relative washout. It was discussed at that time possible referral back to the [place] tumor board but this was not subsequently done. Patient subsequently most recently had an MRI of the abdomen with contrast on 8/30/2019 to reassess his liver lesions. At that time multiple enhancing lesions within segment 8 of the liver were seen with the largest measuring up to 2 cm in size and smaller lesions were also noted as well. There is a 3 mm lesion within segment 6 of the liver with faint washout detected. In addition there was a faintly hyperintense 1.4 cm focus within segment 7 of the liver demonstrating no evidence of arterial enhancement at the site of his previous ablation however there was a 2.1 cm focus of washout detected medial to this area which had enlarged in comparison to his  "previous MRI in 2019." He was referred to Dr Geronimo. Labs at [facility] showed preserved liver function and normal AFP.   CT A/P 19 showed "A 15 x 17 mm hypoenhancing nodule right lobe of the liver is noted and likely represents a treated HCC.  No new or other malignant liver lesion can be documented. Hepatic cirrhosis with multiple esophageal and perigastric varices. Previous endovascular repair of abdominal aortic aneurysm.  Aneurysm sac has decreased in size. Chronic dilatation of the intrahepatic bile ducts left lobe of liver is stable. Nonobstructing right nephrolithiasis; cholelithiasis. Enlarged prostate gland  CT chest 19 showed "Dependent atelectasis or scarring within the lower lobes bilaterally is noted.  No mass or nodule is seen.  Esophageal varices"  Local treatment was recommended. Patient presents today for a second opinion. He is feeling well. Does have chest congestion from the weather changes. Uses nebulizer at home for COPD. Drinks 3 glasses of wine every evening. Chases his 4yr granddaughter at home. Cannot walk far because of COPD.   He was referred to IR for locoregional therapy.   2. S/p radioembolization on 20 and 3/8/21. MRI 21 showed interval growth of multifocal HCC compared to 2021 scan. S/p Y90 on 2021.    Interval History:   Mr Lang returns for follow up. Still has scrotal and leg swelling. On lasix and spironolactone. Does not have a hepatologist.      ECO    ROS:   A ten-point system review is obtained and negative except for what was stated in the Interval History.     Physical Examination:   General: well hydrated, well developed, in no acute distress  HEENT: normocephalic, EOMI, anicteric sclerae  Neuro: alert and oriented x 4  Psych: pleasant and appropriate mood and affect    Objective:     Laboratory Data:  Labs reviewed. Bilirubin 2.9. albumin 3.0. INR normal. AFP increased from 205 to 1225    Imaging Data:  MRI Abdomen " 1/5/2022:  Cirrhosis with stigmata of portal hypertension including upper abdominal venous collaterals and small volume ascites.     Posttreatment changes throughout the liver from multiple prior radioembolizations.     New and enlarging arterially enhancing lesions throughout the liver, many of which demonstrate washout and pseudo capsule, consistent with worsening multifocal HCC.     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     Unchanged moderate intrahepatic biliary ductal dilatation.    CT chest 1/5/2022:  No specific CT evidence of new thoracic metastatic disease.     Stable 4 mm right upper lobe pulmonary nodule.  Attention on follow-up recommended.     Subsegmental ground-glass and micronodular opacity left upper lobe, possibly small airways infection/inflammation.     Cirrhosis with post embolization changes and sequelae of portal hypertension, better evaluated on above comparison MRI.     Distended gallbladder with cholelithiasis.    Assessment and Plan:     1. HCC (hepatocellular carcinoma)    2. Cirrhosis of liver with ascites, unspecified hepatic cirrhosis type    3. Edema, unspecified type      1.  - Mr Lang is an 82 yo man with multifocal HCC, initially BCLC stage B. Now BCLC stage C. S/p radioembolization on 1/2/20 and 3/8/21. MRI 8/27/21 showed interval growth of multifocal HCC compared to April 2021 scan. He underwent Y90 ablation on 11/26/21. At time of ablation he was noted to have associated portal vein tumor thrombus in the inferior aspect of left hepatic lobe  - long discussion with patient and wife re scan results. Tumor has progressed with new lesions developed. 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 I suggest starting systemic therapy with tecentriq and avastin. Suggest starting with tecentriq with avastin added after he completes EGD to r/o esophageal varices  - we discussed the regimen and schedule of tecentriq/avastin.   - The side effects  of tecentriq were discussed with patient, which include but are not limited to fatigue, weakness, decreased appetite, nausea, vomiting, diarrhea, skin reactions and rashes which can be severe, inflammation of organs including stomach, bowels, liver, brain, nervous system, lungs, liver, kidney, thyroid.    - Side effects of avastin discussed with patient, which include but are not limited to proteinuria, high blood pressure, severe bleeding that may be life threatening, blood clots in legs or lungs, heart attack, stroke, bowel perforation or fistula formation, difficulty with wound healing.   - discussed with patient that he can start with single-agent tecentriq, with avastin added later after he completes EGD to r/o esophageal varices  - patient wants to receive treatment under the care of Dr Geronimo's at [facility]. He will reach out to PCP re referral to [facility]. My office will fax a copy of my note to PCP and Dr Geronimo's office  - He is not sure if he can see us here and Dr Geronimo at [facility] at the [facility] time with [facility]. We are not scheduling a f/u appt at this time. He knows to call us if he needs to return to see us    2.3  - discussed with patient that his impaired liver function needs to be managed by a hepatologist. Scrotal/leg swelling is from liver failure. Asked patient to reach out to PCP for referral to a hepatologist. He also needs to get EGD locally before avastin added. Patient understands and agrees with the plan.       Follow-up:     RTC prn  Knows to call if any problems arise.    Electronically signed by Best Delgado

## 2022-01-06 NOTE — Clinical Note
Please fax copies of my note today to Dr Geronimo (and/or his partners) at [facility] (hem/onc) and patient's PCP [facility], [facility] ([phone] and/or [phone])

## 2022-01-24 ENCOUNTER — PATIENT MESSAGE (OUTPATIENT)
Dept: HEMATOLOGY/ONCOLOGY | Facility: CLINIC | Age: 82
End: 2022-01-24
Payer: OTHER GOVERNMENT

## 2022-01-24 ENCOUNTER — TELEPHONE (OUTPATIENT)
Dept: INTERVENTIONAL RADIOLOGY/VASCULAR | Facility: CLINIC | Age: 82
End: 2022-01-24
Payer: OTHER GOVERNMENT

## 2022-01-24 NOTE — TELEPHONE ENCOUNTER
"1/18: I just spoke with him and he "doesn't want to drive 5 hours for a 30 minute consult". I also think he's very confused about what's going on. I explained everything to him and he told me we need to contact Dr. Delgado's office because " they're trying to do the same thing as us".. I put him on hold to ask Argenis for help on this and he hung up and won't answer my call now. Argenis said she will call tomorrow morning.     "

## 2022-01-24 NOTE — TELEPHONE ENCOUNTER
"Spoke to pt regarding scheduling an IR clinic follow up. Dr Woodward is recommending additional treatment. ([facility]) Pt stated, we should  follow up with Dr. Delgado regarding his care." Dr Delgado prescribe prescription for treatment and [facility] [place] regarding his care". I explained that IR  recommend additional treatment. Patient still feels like this was already taken care of regarding his care.Want us to reached out to Dr. Delgado. Forward message to providers.  "

## 2022-01-25 ENCOUNTER — PATIENT MESSAGE (OUTPATIENT)
Dept: HEMATOLOGY/ONCOLOGY | Facility: CLINIC | Age: 82
End: 2022-01-25
Payer: OTHER GOVERNMENT

## 2022-01-25 ENCOUNTER — TELEPHONE (OUTPATIENT)
Dept: HEMATOLOGY/ONCOLOGY | Facility: CLINIC | Age: 82
End: 2022-01-25
Payer: OTHER GOVERNMENT

## 2022-01-25 NOTE — TELEPHONE ENCOUNTER
----- Message from Zara West sent at 1/25/2022 10:02 AM CST -----  Regarding: Pt Inquiry  Ruiz Martin  with [facility] called requesting Progress Notes . Advised pt stated proper paperwork was completed . Awaiting staff to assist in forwarding completed documentation.    Ruiz [phone] office / [phone] or last [phone]  fax